# Patient Record
Sex: FEMALE | Race: WHITE | Employment: FULL TIME | URBAN - METROPOLITAN AREA
[De-identification: names, ages, dates, MRNs, and addresses within clinical notes are randomized per-mention and may not be internally consistent; named-entity substitution may affect disease eponyms.]

---

## 2017-10-03 ENCOUNTER — ALLSCRIPTS OFFICE VISIT (OUTPATIENT)
Dept: OTHER | Facility: OTHER | Age: 42
End: 2017-10-03

## 2017-10-27 NOTE — PROGRESS NOTES
Assessment  1  Nodule of tendon sheath (360 34) (M67 90)    Plan  Nodule of tendon sheath    · 1 - Carlos RIVERA, Lorenzo Bojorquez  (Orthopedic Surgery) Co-Management  *  Status: Active   Requested for: [id number]  Care Summary provided  : Yes    Discussion/Summary    #1  Right 4th finger tendon sheath nodule- refer to [facility] Dr Kavitha Gil for eval  and treat  Chief Complaint  c/o lump at base of 4th finger on right hand  Pt  states she has had it for several years but it is becoming painful and affecting daily life  kck      History of Present Illness  HPI: Patient here today because she has had a right 4th finger tendon sheath lump for over 2 years now which in the past has never been painful however most recently for the past few weeks has become bigger and painful and hurts when she is doing certain things like weights at the gym or caring or gripping objects  She is right-handed  At this point in time she would like to see the specialist who can take care of this  Review of Systems    Constitutional: No fever, no chills, feels well, no tiredness, no recent weight gain or loss  ENT: no ear ache, no loss of hearing, no nosebleeds or nasal discharge, no sore throat or hoarseness  Cardiovascular: no complaints of slow or fast heart rate, no chest pain, no palpitations, no leg claudication or lower extremity edema  Respiratory: no complaints of shortness of breath, no wheezing, no dyspnea on exertion, no orthopnea or PND  Breasts: no complaints of breast pain, breast lump or nipple discharge  Gastrointestinal: no complaints of abdominal pain, no constipation, no nausea or diarrhea, no vomiting, no bloody stools  Genitourinary: no complaints of dysuria, no incontinence, no pelvic pain, no dysmenorrhea, no vaginal discharge or abnormal vaginal bleeding  Musculoskeletal: as noted in HPI  Integumentary: no complaints of skin rash or lesion, no itching or dry skin, no skin wounds     Neurological: no complaints of headache, no confusion, no numbness or tingling, no dizziness or fainting  ROS reviewed  Active Problems  1  Allergic rhinitis (477 9) (J30 9)   2  Canker sores oral (528 2) (K12 0)   3  Chronic low back pain (724 2,338 29) (M54 5,G89 29)   4  Depression (311) (F32 9)   5  Foot pain (729 5) (M79 673)   6  Hand abrasion (914 0) (S60 519A)   7  Heel pain (729 5) (M79 673)   8  Imaging Studies Nonspecific Abnormal Findings Spine (793 99)   9  Immunotherapy   10  Migraine headache (346 90) (G43 909)   11  Need for prophylactic vaccination and inoculation against influenza (V04 81) (Z23)   12  Nodule of tendon sheath (727 89) (M67 90)   13  Pain in joint of right hip (719 45) (M25 551)    Past Medical History  1  History of chicken pox (V12 09) (Z86 19)  Active Problems And Past Medical History Reviewed: The active problems and past medical history were reviewed and updated today  Family History  Father    1  Family history of Diabetes Mellitus (V18 0)   2  Family history of Hyperlipidemia  Family History Reviewed: The family history was reviewed and updated today  Social History   · Being A Social Drinker   · Denied: History of Drug Use   · Marital History - Currently    · Never A Smoker  The social history was reviewed and updated today  Surgical History  Surgical History Reviewed: The surgical history was reviewed and updated today  Current Meds   1  Daily Value Multivitamin Oral Tablet; Take 1 tablet daily Recorded   2  ZyrTEC Allergy TABS Recorded    The medication list was reviewed and updated today  Allergies  1  No Known Drug Allergies    Vitals   Recorded: 42ARZ7415 02:08PM   Heart Rate 68   Systolic 938, LUE, Sitting   Diastolic 60, LUE, Sitting   Height 5 ft 3 in     Physical Exam    Constitutional   General appearance: No acute distress, well appearing and well nourished  Eyes   Conjunctiva and lids: No swelling, erythema or discharge      Ears, Nose, Mouth, and Throat   External inspection of ears and nose: Normal     Pulmonary   Respiratory effort: No increased work of breathing or signs of respiratory distress  Musculoskeletal   Gait and station: Normal     Inspection/palpation of joints, bones, and muscles: Abnormal  -- Right fourth and see PT joint volar surface with a palpable movable 3 mm sized nodule noted on the flexor surface of the MCP 4th right finger     Psychiatric   Mood and affect: Normal          Signatures   Electronically signed by : [facility]; Oct  3 2017  2:32PM EST                       (Author)    Electronically signed by : Corey Andrade MD; Oct  3 2017  4:59PM EST                       (Author)

## 2017-11-15 ENCOUNTER — ALLSCRIPTS OFFICE VISIT (OUTPATIENT)
Dept: OTHER | Facility: OTHER | Age: 42
End: 2017-11-15

## 2017-11-15 ENCOUNTER — HOSPITAL ENCOUNTER (OUTPATIENT)
Dept: RADIOLOGY | Facility: HOSPITAL | Age: 42
Discharge: HOME | End: 2017-11-15
Attending: ORTHOPAEDIC SURGERY
Payer: COMMERCIAL

## 2017-11-15 DIAGNOSIS — M79.646 PAIN OF FINGER: ICD-10-CM

## 2017-11-15 PROCEDURE — 73130 X-RAY EXAM OF HAND: CPT

## 2017-11-16 NOTE — PROGRESS NOTES
Assessment    1  Ganglion cyst of finger of right hand (722 43) (M67 441)    Plan  Finger pain    · * XR FINGER RIGHT SECOND DIGIT-INDEX; Status:Active - Retrospective By ProtocolAuthorization; Requested for:[id number];   Ganglion cyst of finger of right hand    · [facility] Instruction Sheet Ganglion Cysts Given; Status:Complete;   Done: 29BED6927    Discussion/Summary    Discussed conservative versus nonconservative treatment options, versus benefits and prognosis  this time, patient opts for conservative management of gentle massage over area and monitoring  restrictions on weightbearing or activities at this timecondition worsensas neededattestation:  Dennie Sero am acting as a scribe while in the presence of the attending physician  Joslyn Murrell MD, personally performed the services described in this documentation as scribed in my presence  Chief Complaint    1  Finger Problem  Patient presents for a small painful lump at the base of her right ring finger times approximately 1-2 months  History of Present Illness  HPI: Patient is a 15-year-old right-hand-dominant female who presents for a small painful lump at the base of her right ring finger  She states she has noticed this lump in the past and it has gone away on its own  However, now it has been present for approximately the past 1 month  It is not growing in size  It is tender to palpation and when objects rest on it  She denies any numbness tingling fever or chills  Denies any past or recent trauma to set finger  is noncontributoryis noncontributorypast medical, surgical, family, and social history as well as medications and allergies were reviewed  Updates as needed were performed  Review of systems was recorded on a separate intake sheet, this was reviewed as well  Review of Systems   Constitutional: no fever-- and-- no chills  Eyes: no eyesight problems-- and-- eyes not red    ENT: no hearing loss,-- no nosebleeds-- and-- no sore throat  Cardiovascular: no chest pain-- and-- no palpitations  Respiratory: no shortness of breath-- and-- no wheezing  Gastrointestinal: no abdominal pain  Musculoskeletal: as noted in HPI  Integumentary: no rashes  Neurological: no numbness-- and-- no tingling  Active Problems  1  Allergic rhinitis (477 9) (J30 9)   2  Canker sores oral (528 2) (K12 0)   3  Chronic low back pain (724 2,338 29) (M54 5,G89 29)   4  Depression (311) (F32 9)   5  Finger pain (729 5) (M79 646)   6  Foot pain (729 5) (M79 673)   7  Hand abrasion (914 0) (S60 519A)   8  Heel pain (729 5) (M79 673)   9  Imaging Studies Nonspecific Abnormal Findings Spine (793 99)   10  Immunotherapy   11  Migraine headache (346 90) (G43 909)   12  Need for prophylactic vaccination and inoculation against influenza (V04 81) (Z23)   13  Nodule of tendon sheath (727 89) (M67 90)   14  Pain in joint of right hip (719 45) (M25 551)    Past Medical History   · History of chicken pox (V12 09) (Z86 19)    Family History   · Family history of Diabetes Mellitus (V18 0)   · Family history of Hyperlipidemia    Social History   · Being A Social Drinker   · Denied: History of Drug Use   · Marital History - Currently    · Never A Smoker    Current Meds   1  Daily Value Multivitamin Oral Tablet; Take 1 tablet daily Recorded   2  ZyrTEC Allergy TABS Recorded    Allergies  1  No Known Drug Allergies    Vitals  Signs     Heart Rate: 83  Systolic: 845  Diastolic: 75  Height: 5 ft 3 in  Weight: 171 lb 4 oz  BMI Calculated: 30 34  BSA Calculated: 1 81    Physical Exam     General: No acute distress, age-appropriate  HEENT: Normocephalic atraumatic, mucous membranes are moist, sclera are nonicteric  Cardiovascular: No discernable arrhythmia  Respiratory: Breathing is even and unlabored, no stridor or audible wheezing  Right Basic:  Right hand/fingers: There is no gross deformity, erythema edema or ecchymosis   There is a small, 2 x 2 mm hard nodule at the base of the right ring finger on the volar side midline  This is slightly tender to palpation  There is no discharge  Full active and passive range of motion motion at the ring finger  Sensation is intact brisk capillary refill  Results/Data  I personally reviewed the films/images/results in the office today  My interpretation follows  X-ray Review X-rays of the right hand and fingers appear normal there is no gross deformity, no fracture, no lucencies or bony lesions        Signatures   Electronically signed by : LORELEI Kirby; Nov 15 2017  9:13AM EST                       (Author)    Electronically signed by : FYA Viramontes ; Nov 15 2017 12:53PM EST                       (Author)

## 2018-01-13 VITALS
HEART RATE: 83 BPM | DIASTOLIC BLOOD PRESSURE: 75 MMHG | SYSTOLIC BLOOD PRESSURE: 110 MMHG | BODY MASS INDEX: 30.34 KG/M2 | HEIGHT: 63 IN | WEIGHT: 171.25 LBS

## 2018-01-14 VITALS — HEIGHT: 63 IN | HEART RATE: 68 BPM | SYSTOLIC BLOOD PRESSURE: 128 MMHG | DIASTOLIC BLOOD PRESSURE: 60 MMHG

## 2018-01-16 NOTE — CONSULTS
Chief Complaint    1  Finger Problem  Patient presents for a small painful lump at the base of her right ring finger times approximately 1-2 months  History of Present Illness  HPI: Patient is a 13-year-old right-hand-dominant female who presents for a small painful lump at the base of her right ring finger  She states she has noticed this lump in the past and it has gone away "on its own"  However, now it has been present for approximately the past 1 month  It is not growing in size  It is tender to palpation and when objects rest on it  She denies any numbness tingling fever or chills  Denies any past or recent trauma to set finger  PMH is noncontributory  PSH is noncontributory  The past medical, surgical, family, and social history as well as medications and allergies were reviewed  Updates as needed were performed  Review of systems was recorded on a separate intake sheet, this was reviewed as well  Review of Systems    Constitutional: no fever and no chills  Eyes: no eyesight problems and eyes not red  ENT: no hearing loss, no nosebleeds and no sore throat  Cardiovascular: no chest pain and no palpitations  Respiratory: no shortness of breath and no wheezing  Gastrointestinal: no abdominal pain  Musculoskeletal: as noted in HPI  Integumentary: no rashes  Neurological: no numbness and no tingling  Active Problems    1  Allergic rhinitis (477 9) (J30 9)   2  Canker sores oral (528 2) (K12 0)   3  Chronic low back pain (724 2,338 29) (M54 5,G89 29)   4  Depression (311) (F32 9)   5  Finger pain (729 5) (M79 646)   6  Foot pain (729 5) (M79 673)   7  Hand abrasion (914 0) (S60 519A)   8  Heel pain (729 5) (M79 673)   9  Imaging Studies Nonspecific Abnormal Findings Spine (793 99)   10  Immunotherapy   11  Migraine headache (346 90) (G43 909)   12  Need for prophylactic vaccination and inoculation against influenza (V04 81) (Z23)   13   Nodule of tendon sheath (727 89) (M67 90) 14  Pain in joint of right hip (111 45) (M24 001)    Past Medical History    · History of chicken pox (V12 09) (Z86 19)    Family History    · Family history of Diabetes Mellitus (V18 0)   · Family history of Hyperlipidemia    Social History    · Being A Social Drinker   · Denied: History of Drug Use   · Marital History - Currently    · Never A Smoker    Current Meds   1  Daily Value Multivitamin Oral Tablet; Take 1 tablet daily Recorded   2  ZyrTEC Allergy TABS Recorded    Allergies    1  No Known Drug Allergies    Vitals  Signs    Heart Rate: 49MZKTXTRB: 135AQJRWKNJU: 25TRKMTY: 5 ft 3 inWeight: 171 lb 4 ozBMI Calculated: 30 34BSA Calculated: 1 81    Physical Exam      General: No acute distress, age-appropriate  HEENT: Normocephalic atraumatic, mucous membranes are moist, sclera are nonicteric  Cardiovascular: No discernable arrhythmia  Respiratory: Breathing is even and unlabored, no stridor or audible wheezing  Right Basic:   Right hand/fingers: There is no gross deformity, erythema edema or ecchymosis  There is a small, 2 x 2 mm hard nodule at the base of the right ring finger on the volar side midline  This is slightly tender to palpation  There is no discharge  Full active and passive range of motion motion at the ring finger  Sensation is intact brisk capillary refill  Results/Data  I personally reviewed the films/images/results in the office today  My interpretation follows  X-ray Review X-rays of the right hand and fingers appear normal there is no gross deformity, no fracture, no lucencies or bony lesions  Assessment    1  Ganglion cyst of finger of right hand (728 43) (M67 210)    Plan     Finger pain    · * XR FINGER RIGHT SECOND DIGIT-INDEX; Status:Active - Retrospective By Protocol  Authorization;  Requested for:15Ncl1020;      Ganglion cyst of finger of right hand    · [facility] Instruction Sheet Ganglion Cysts Given; Status:Complete;   Done: 98TWR1530    Discussion/Summary    Discussed conservative versus nonconservative treatment options, versus benefits and prognosis  At this time, patient opts for conservative management of gentle massage over area and monitoring  No restrictions on weightbearing or activities at this time  Call condition worsens  Follow-up as needed  Scribe attestation:    Ednarohan Sandy am acting as a scribe while in the presence of the attending physician  Physician Attestation:    Mauro Ellis MD, personally performed the services described in this documentation as scribed in my presence        Signatures   Electronically signed by : LORELEI Porter; Nov 15 2017  9:13AM EST                       (Author)    Electronically signed by : FAY Melendez ; Nov 15 2017 12:53PM EST                       (Author)

## 2018-01-17 NOTE — CONSULTS
Chief Complaint    1  Finger Problem  Chief Complaint Free Text Note Form: Patient presents for a small painful lump at the base of her right ring finger times approximately 1-2 months  History of Present Illness  HPI: Patient is a 24-year-old right-hand-dominant female who presents for a small painful lump at the base of her right ring finger  She states she has noticed this lump in the past and it has gone away "on its own"  However, now it has been present for approximately the past 1 month  It is not growing in size  It is tender to palpation and when objects rest on it  She denies any numbness tingling fever or chills  Denies any past or recent trauma to set finger  PMH is noncontributory  PSH is noncontributory  The past medical, surgical, family, and social history as well as medications and allergies were reviewed  Updates as needed were performed  Review of systems was recorded on a separate intake sheet, this was reviewed as well  Review of Systems  Focused-Female Orthopedics:   Constitutional: no fever and no chills  Eyes: no eyesight problems and eyes not red  ENT: no hearing loss, no nosebleeds and no sore throat  Cardiovascular: no chest pain and no palpitations  Respiratory: no shortness of breath and no wheezing  Gastrointestinal: no abdominal pain  Musculoskeletal: as noted in HPI  Integumentary: no rashes  Neurological: no numbness and no tingling  Active Problems    1  Allergic rhinitis (477 9) (J30 9)   2  Canker sores oral (528 2) (K12 0)   3  Chronic low back pain (724 2,338 29) (M54 5,G89 29)   4  Depression (311) (F32 9)   5  Finger pain (729 5) (M79 646)   6  Foot pain (729 5) (M79 673)   7  Hand abrasion (914 0) (S60 519A)   8  Heel pain (729 5) (M79 673)   9  Imaging Studies Nonspecific Abnormal Findings Spine (793 99)   10  Immunotherapy   11  Migraine headache (346 90) (G43 909)   12   Need for prophylactic vaccination and inoculation against influenza (V04 81) (Z23)   13  Nodule of tendon sheath (727 89) (M67 90)   14  Pain in joint of right hip (719 45) (M25 821)    Past Medical History    1  History of chicken pox (V12 09) (Z86 19)    Family History    1  Family history of Diabetes Mellitus (V18 0)   2  Family history of Hyperlipidemia    Social History    · Being A Social Drinker   · Denied: History of Drug Use   · Marital History - Currently    · Never A Smoker    Current Meds   1  Daily Value Multivitamin Oral Tablet; Take 1 tablet daily Recorded   2  ZyrTEC Allergy TABS Recorded    Allergies    1  No Known Drug Allergies    Vitals  Signs   Recorded: 39GIO2403 08:42AM   Heart Rate: 83  Systolic: 060  Diastolic: 75  Height: 5 ft 3 in  Weight: 171 lb 4 oz  BMI Calculated: 30 34  BSA Calculated: 1 81    Physical Exam      General: No acute distress, age-appropriate  HEENT: Normocephalic atraumatic, mucous membranes are moist, sclera are nonicteric  Cardiovascular: No discernable arrhythmia  Respiratory: Breathing is even and unlabored, no stridor or audible wheezing  Right Basic:   Right hand/fingers: There is no gross deformity, erythema edema or ecchymosis  There is a small, 2 x 2 mm hard nodule at the base of the right ring finger on the volar side midline  This is slightly tender to palpation  There is no discharge  Full active and passive range of motion motion at the ring finger  Sensation is intact brisk capillary refill  Results/Data  Diagnostic Studies Reviewed: I personally reviewed the films/images/results in the office today  My interpretation follows  X-ray Review X-rays of the right hand and fingers appear normal there is no gross deformity, no fracture, no lucencies or bony lesions  Assessment    1  Ganglion cyst of finger of right hand (907 43) (M67 441)    Plan  Finger pain    1  * XR FINGER RIGHT SECOND DIGIT-INDEX; Status:Active - Retrospective By Protocol   Authorization;  Requested for:[id number];   Ganglion cyst of finger of right hand    2  [facility] Instruction Sheet Ganglion Cysts Given; Status:Complete;   Done: 27FWN2292    Discussion/Summary  Discussion Summary:   Discussed conservative versus nonconservative treatment options, versus benefits and prognosis  At this time, patient opts for conservative management of gentle massage over area and monitoring  No restrictions on weightbearing or activities at this time  Call condition worsens  Follow-up as needed  Scribe attestation:    Nicole Joseph am acting as a scribe while in the presence of the attending physician  Physician Attestation:    Shilpa Jesus MD, personally performed the services described in this documentation as scribed in my presence        Signatures   Electronically signed by : LORELEI Agustin; Nov 15 2017  9:13AM EST                       (Author)    Electronically signed by : FAY Hall ; Nov 15 2017 12:53PM EST                       (Author)

## 2018-06-28 ENCOUNTER — OFFICE VISIT (OUTPATIENT)
Dept: FAMILY MEDICINE CLINIC | Facility: CLINIC | Age: 43
End: 2018-06-28
Payer: COMMERCIAL

## 2018-06-28 VITALS
DIASTOLIC BLOOD PRESSURE: 70 MMHG | HEART RATE: 68 BPM | BODY MASS INDEX: 30.26 KG/M2 | SYSTOLIC BLOOD PRESSURE: 124 MMHG | WEIGHT: 170.8 LBS

## 2018-06-28 DIAGNOSIS — R53.82 CHRONIC FATIGUE: Primary | ICD-10-CM

## 2018-06-28 DIAGNOSIS — F33.0 MILD EPISODE OF RECURRENT MAJOR DEPRESSIVE DISORDER (HCC): ICD-10-CM

## 2018-06-28 PROBLEM — R53.83 FATIGUE: Status: ACTIVE | Noted: 2018-05-08

## 2018-06-28 PROBLEM — M67.441 GANGLION CYST OF FINGER OF RIGHT HAND: Status: ACTIVE | Noted: 2017-11-15

## 2018-06-28 PROCEDURE — 99214 OFFICE O/P EST MOD 30 MIN: CPT | Performed by: PHYSICIAN ASSISTANT

## 2018-06-28 RX ORDER — ESCITALOPRAM OXALATE 10 MG/1
10 TABLET ORAL DAILY
Qty: 30 TABLET | Refills: 1 | Status: SHIPPED | OUTPATIENT
Start: 2018-06-28 | End: 2018-11-19 | Stop reason: SDUPTHER

## 2018-06-28 NOTE — PATIENT INSTRUCTIONS
Problem List Items Addressed This Visit        Other    Mild episode of recurrent major depressive disorder ([facility] [address] )     Plan is to start low dose generic lexapro 10 mg 1/2 once daily and recheck in 4 weeks  Check to see if Hernan Arnold is still practicing therapy at our office  Relevant Medications    escitalopram (LEXAPRO) 10 mg tablet    Fatigue - Primary     TSH CBC within normal limits Lyme and CMP fasting ordered           Relevant Orders    Lyme Antibody Profile with reflex to WB    Comprehensive metabolic panel

## 2018-06-28 NOTE — PROGRESS NOTES
Assessment/Plan:    Fatigue  TSH CBC within normal limits Lyme and CMP fasting ordered  Mild episode of recurrent major depressive disorder ([address] )  Plan is to start low dose generic lexapro 10 mg 1/2 once daily and recheck in 4 weeks  Check to see if Becky Steven is still practicing therapy at our office  Diagnoses and all orders for this visit:    Chronic fatigue  -     Lyme Antibody Profile with reflex to WB; Future  -     Comprehensive metabolic panel; Future    Mild episode of recurrent major depressive disorder (HCC)  -     escitalopram (LEXAPRO) 10 mg tablet; Take 1 tablet (10 mg total) by mouth daily for 30 days          Subjective:   CC: c/o increased fatigue over the last several months  Pt  Did have blood work done with GYN  Pt  Would like to discuss blood work results in care everywhere  Pt  States she was treated for depression in her early 19's and feels like she is having an increase in depression sx  Pt  States she was on a small dose of Paxil previously  [facility]        Patient ID: Sadie Kruse is a 37 y o  female  Patient here today with complaints of fatigue and likely resurfacing of depression  She states that when she was in her 25s she was treated for roughly about 3 years with Paxil at 5 mg daily  She states the 10 mg made her to excited and giddy  She states that recently last fall 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 and then in January her  fell and broke his right ankle and was unable to drive and needed lots of appointments and surgery and therefore she all of a sudden had to go 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 parent having to do everything in the entire house and also dealing with her 17-year-old and 15year-old  They also were racing seeing eye dog at that time  Also her and her  since then has been seeing a counselor and having stress with her marriage    She states that every time she feels like something in her life light needs up something else happen for example this week her father law had heart attack in [place] and now she has to care for their [place] home  Her 1 good friend got a divorce and has to move out of her house and she is trying to be strong and help her as well  Patient states that she is tearful very often  She was having lots of difficulty sleeping but had this had to do with her hip which she has been getting worked on by chiropractor and this is actually improving  No suicidal or homicidal ideations  She did get blood work done for fatigue with gyn which included a normal TSH and CBC  Lyme and fasting CMP were not ordered  She states that she feels that the family counseling her too much and she is very overwhelmed and sometimes thinks that if she were just to leave the house for 1 week and come back but would finally realize how much she actually does for the family and she would stop feeling like a maid  The following portions of the patient's history were reviewed and updated as appropriate: allergies, current medications, past family history, past medical history, past social history, past surgical history and problem list     Review of Systems   Constitutional: Positive for fatigue  HENT: Negative  Eyes: Negative  Respiratory: Negative  Cardiovascular: Negative  Gastrointestinal: Negative  Endocrine: Negative  Genitourinary: Negative  Musculoskeletal: Negative  Skin: Negative  Allergic/Immunologic: Negative  Neurological: Negative  Hematological: Negative  Psychiatric/Behavioral: Positive for dysphoric mood and sleep disturbance  Negative for suicidal ideas  Objective:      Vitals:    06/28/18 0903   BP: 124/70   BP Location: Left arm   Patient Position: Sitting   Pulse: 68   Weight: 77 5 kg (170 lb 12 8 oz)            Physical Exam   Constitutional: She is oriented to person, place, and time   She appears well-developed and well-nourished  No distress  HENT:   Head: Normocephalic and atraumatic  Eyes: Conjunctivae are normal  Right eye exhibits no discharge  Left eye exhibits no discharge  Neck: Trachea normal  Neck supple  Carotid bruit is not present  No thyromegaly present  Cardiovascular: Normal rate, regular rhythm and normal heart sounds  Exam reveals no gallop and no friction rub  No murmur heard  Pulmonary/Chest: Effort normal and breath sounds normal  No respiratory distress  She has no wheezes  She has no rales  Neurological: She is alert and oriented to person, place, and time  Skin: Skin is warm and dry  She is not diaphoretic  Psychiatric: She has a normal mood and affect  Judgment normal    Nursing note and vitals reviewed

## 2018-07-19 LAB
ALBUMIN SERPL-MCNC: 4.1 G/DL (ref 3.6–5.1)
ALBUMIN/GLOB SERPL: 2 (CALC) (ref 1–2.5)
ALP SERPL-CCNC: 35 U/L (ref 33–115)
ALT SERPL-CCNC: 13 U/L (ref 6–29)
AST SERPL-CCNC: 12 U/L (ref 10–35)
B BURGDOR AB SER IA-ACNC: <0.9 INDEX
BILIRUB SERPL-MCNC: 0.5 MG/DL (ref 0.2–1.2)
BUN SERPL-MCNC: 14 MG/DL (ref 7–25)
BUN/CREAT SERPL: NORMAL (CALC) (ref 6–22)
CALCIUM SERPL-MCNC: 9 MG/DL (ref 8.6–10.2)
CHLORIDE SERPL-SCNC: 107 MMOL/L (ref 98–110)
CO2 SERPL-SCNC: 25 MMOL/L (ref 20–31)
CREAT SERPL-MCNC: 0.69 MG/DL (ref 0.5–1.1)
GLOBULIN SER CALC-MCNC: 2.1 G/DL (CALC) (ref 1.9–3.7)
GLUCOSE SERPL-MCNC: 94 MG/DL (ref 65–99)
POTASSIUM SERPL-SCNC: 4.3 MMOL/L (ref 3.5–5.3)
PROT SERPL-MCNC: 6.2 G/DL (ref 6.1–8.1)
SL AMB EGFR AFRICAN AMERICAN: 122 ML/MIN/1.73M2
SL AMB EGFR NON AFRICAN AMERICAN: 105 ML/MIN/1.73M2
SODIUM SERPL-SCNC: 138 MMOL/L (ref 135–146)

## 2018-08-16 ENCOUNTER — OFFICE VISIT (OUTPATIENT)
Dept: FAMILY MEDICINE CLINIC | Facility: CLINIC | Age: 43
End: 2018-08-16
Payer: COMMERCIAL

## 2018-08-16 VITALS
HEIGHT: 64 IN | BODY MASS INDEX: 29.81 KG/M2 | WEIGHT: 174.6 LBS | SYSTOLIC BLOOD PRESSURE: 118 MMHG | HEART RATE: 68 BPM | DIASTOLIC BLOOD PRESSURE: 60 MMHG

## 2018-08-16 DIAGNOSIS — F33.0 MILD EPISODE OF RECURRENT MAJOR DEPRESSIVE DISORDER (HCC): Primary | ICD-10-CM

## 2018-08-16 PROCEDURE — 3008F BODY MASS INDEX DOCD: CPT | Performed by: PHYSICIAN ASSISTANT

## 2018-08-16 PROCEDURE — 99213 OFFICE O/P EST LOW 20 MIN: CPT | Performed by: PHYSICIAN ASSISTANT

## 2018-08-16 NOTE — PROGRESS NOTES
Assessment/Plan:    Mild episode of recurrent major depressive disorder (HCC)  Improved on 1/2 lexapro 10 mg once daily  Continue however pt may increase to a full tab at any time  Recheck in 3 months  Diagnoses and all orders for this visit:    Mild episode of recurrent major depressive disorder ([address] )    Other orders  -     Cetirizine HCl (ZYRTEC ALLERGY) 10 MG CAPS; Take by mouth  -     Multiple Vitamin (DAILY VALUE MULTIVITAMIN PO); Take 1 tablet by mouth daily          Subjective:   CC: 1 month follow up for medication check  Pt  Has been taking lexapro 5mg, she has not increased to the 10mg at this point  Patient ID: Garrett Tnog is a 37 y o  female  Patient here today to review initiation of Lexapro  She has been taking half 10 mg once daily for 1 month now and she feels a great difference  She states that she is now able to handle any stressful situation in a calmer [place] without getting angry and upset  She has been very busy with her summer schedule bring her kids to different various camps working new puppy visiting in [facility] in [place]  She did just come back from vacation herself  She feels that is working great at this point time but does not know how she will feel once a kids get into their normal school year routine  No side effects noted  No longer crying with a little as thing like watching TV and a sad show comes on  The following portions of the patient's history were reviewed and updated as appropriate: allergies, current medications, past family history, past medical history, past social history, past surgical history and problem list     Review of Systems   Constitutional: Negative  HENT: Negative  Eyes: Negative  Respiratory: Negative  Cardiovascular: Negative  Gastrointestinal: Negative  Endocrine: Negative  Genitourinary: Negative  Musculoskeletal: Negative  Skin: Negative  Allergic/Immunologic: Negative      Neurological: Negative  Hematological: Negative  Psychiatric/Behavioral: Negative  Objective:      Vitals:    08/16/18 1132   BP: 118/60   BP Location: Left arm   Patient Position: Sitting   Pulse: 68   Weight: 79 2 kg (174 lb 9 6 oz)   Height: 5' 3 5" (1 613 m)            Physical Exam   Constitutional: She is oriented to person, place, and time  She appears well-developed and well-nourished  No distress  HENT:   Head: Normocephalic and atraumatic  Eyes: Conjunctivae are normal  Right eye exhibits no discharge  Left eye exhibits no discharge  Neck: Neck supple  Carotid bruit is not present  Cardiovascular: Normal rate and regular rhythm  Pulmonary/Chest: Effort normal  No respiratory distress  Neurological: She is alert and oriented to person, place, and time  Skin: Skin is warm and dry  She is not diaphoretic  Psychiatric: She has a normal mood and affect  Her behavior is normal  Judgment and thought content normal    Nursing note and vitals reviewed

## 2018-08-16 NOTE — PATIENT INSTRUCTIONS
Problem List Items Addressed This Visit        Other    Mild episode of recurrent major depressive disorder ([facility] [address] ) - Primary     Improved on 1/2 lexapro 10 mg once daily  Continue however pt may increase to a full tab at any time  Recheck in 3 months

## 2018-08-16 NOTE — ASSESSMENT & PLAN NOTE
Improved on 1/2 lexapro 10 mg once daily  Continue however pt may increase to a full tab at any time  Recheck in 3 months

## 2018-11-19 ENCOUNTER — OFFICE VISIT (OUTPATIENT)
Dept: FAMILY MEDICINE CLINIC | Facility: CLINIC | Age: 43
End: 2018-11-19
Payer: COMMERCIAL

## 2018-11-19 VITALS
WEIGHT: 181.8 LBS | SYSTOLIC BLOOD PRESSURE: 124 MMHG | DIASTOLIC BLOOD PRESSURE: 60 MMHG | BODY MASS INDEX: 31.7 KG/M2 | HEART RATE: 80 BPM

## 2018-11-19 DIAGNOSIS — F33.0 MILD EPISODE OF RECURRENT MAJOR DEPRESSIVE DISORDER (HCC): Primary | ICD-10-CM

## 2018-11-19 PROCEDURE — 1036F TOBACCO NON-USER: CPT | Performed by: PHYSICIAN ASSISTANT

## 2018-11-19 PROCEDURE — 99214 OFFICE O/P EST MOD 30 MIN: CPT | Performed by: PHYSICIAN ASSISTANT

## 2018-11-19 RX ORDER — ESCITALOPRAM OXALATE 10 MG/1
10 TABLET ORAL DAILY
Qty: 30 TABLET | Refills: 11 | Status: SHIPPED | OUTPATIENT
Start: 2018-11-19 | End: 2020-03-24 | Stop reason: SDUPTHER

## 2018-11-19 NOTE — ASSESSMENT & PLAN NOTE
Uncontrolled  Will increase lexapro to normal 10 mg daily  She is having her first therapy session with Kirby Hackett today  Recheck in 4-6 weeks after holidays

## 2018-11-19 NOTE — PROGRESS NOTES
Assessment/Plan:     Mild episode of recurrent major depressive disorder (HCC)  Uncontrolled  Will increase lexapro to normal 10 mg daily  She is having her first therapy session with Lorren Homans today  Recheck in 4-6 weeks after holidays  Diagnoses and all orders for this visit:    Mild episode of recurrent major depressive disorder ([facility] [address] )  -     escitalopram (LEXAPRO) 10 mg tablet; Take 1 tablet (10 mg total) by mouth daily for 30 days    Other orders  -     Probiotic Product (PROBIOTIC ADVANCED PO); Take by mouth          Subjective:   CC: 3 month follow up for medication check  Discuss medication dose and refills needed  crystal     Patient ID: Cyn Fernandez is a 37 y o  female  Cyn Fernandez is here for chronic conditions f/u including the diagnosis of Mild episode of recurrent major depressive disorder (hcc)  (primary encounter diagnosis)   Pt  states they are taking all medications as directed without complaints or side effects  Patient is quite upset today because since her last visit in September her son who is 15 and in 7th grade had developed a headache disorder and dizziness and needed [place] be [place] [facility] and basically all workup is negative in the ongoing probable diagnosis is viral but he needs to be on heavy antihistamine as an alternative medication for headaches in small children and the dose has been working and then as he gains weight has not been working and he has been having a very difficult time in school  Patient is looking for to Thanksgiving they are going up to her parent's house by themselves with their dogs  Patient does have her 1st therapy appointment with Dani Wahl next door today  Patient is thinking that she might need to increase her Lexapro dose to a full 10 mg tablet once daily          The following portions of the patient's history were reviewed and updated as appropriate: allergies, current medications, past family history, past medical history, past social history, past surgical history and problem list     Review of Systems   Constitutional: Negative  HENT: Negative  Eyes: Negative  Respiratory: Negative  Cardiovascular: Negative  Gastrointestinal: Negative  Endocrine: Negative  Genitourinary: Negative  Musculoskeletal: Negative  Skin: Negative  Allergic/Immunologic: Negative  Neurological: Negative  Hematological: Negative  Psychiatric/Behavioral: Positive for dysphoric mood and sleep disturbance  The patient is nervous/anxious  Objective:      Vitals:    11/19/18 0842   BP: 124/60   BP Location: Left arm   Patient Position: Sitting   Pulse: 80   Weight: 82 5 kg (181 lb 12 8 oz)            Physical Exam   Constitutional: She is oriented to person, place, and time  She appears well-developed and well-nourished  No distress  HENT:   Head: Normocephalic and atraumatic  Eyes: Conjunctivae are normal  Right eye exhibits no discharge  Left eye exhibits no discharge  Neck: Neck supple  Carotid bruit is not present  Cardiovascular: Normal rate, regular rhythm and normal heart sounds  Exam reveals no gallop and no friction rub  No murmur heard  Pulmonary/Chest: Effort normal and breath sounds normal  No respiratory distress  She has no wheezes  She has no rales  Neurological: She is alert and oriented to person, place, and time  Skin: Skin is warm and dry  She is not diaphoretic  Psychiatric: She has a normal mood and affect  Judgment normal    Very tearful throughout visit  Nursing note and vitals reviewed

## 2018-11-19 NOTE — PATIENT INSTRUCTIONS
Problem List Items Addressed This Visit        Other    Mild episode of recurrent major depressive disorder ([facility] [address] ) - Primary     Uncontrolled  Will increase lexapro to normal 10 mg daily  She is having her first therapy session with Shelby Rodriguez today  Recheck in 4-6 weeks after holidays            Relevant Medications    escitalopram (LEXAPRO) 10 mg tablet

## 2018-12-31 ENCOUNTER — OFFICE VISIT (OUTPATIENT)
Dept: FAMILY MEDICINE CLINIC | Facility: CLINIC | Age: 43
End: 2018-12-31
Payer: COMMERCIAL

## 2018-12-31 VITALS
HEART RATE: 68 BPM | HEIGHT: 64 IN | BODY MASS INDEX: 31.24 KG/M2 | WEIGHT: 183 LBS | DIASTOLIC BLOOD PRESSURE: 64 MMHG | SYSTOLIC BLOOD PRESSURE: 108 MMHG | RESPIRATION RATE: 20 BRPM

## 2018-12-31 DIAGNOSIS — F33.0 MILD EPISODE OF RECURRENT MAJOR DEPRESSIVE DISORDER (HCC): Primary | ICD-10-CM

## 2018-12-31 PROCEDURE — 99213 OFFICE O/P EST LOW 20 MIN: CPT | Performed by: PHYSICIAN ASSISTANT

## 2018-12-31 PROCEDURE — 3008F BODY MASS INDEX DOCD: CPT | Performed by: PHYSICIAN ASSISTANT

## 2018-12-31 PROCEDURE — 1036F TOBACCO NON-USER: CPT | Performed by: PHYSICIAN ASSISTANT

## 2018-12-31 RX ORDER — CETIRIZINE HYDROCHLORIDE 10 MG/1
TABLET, CHEWABLE ORAL
COMMUNITY
Start: 2012-10-23 | End: 2019-02-11
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
## 2018-12-31 NOTE — PATIENT INSTRUCTIONS
Problem List Items Addressed This Visit        Other    Mild episode of recurrent major depressive disorder ([facility] [address] ) - Primary     Much improved  Continue current strength of lexapro  Recheck in 4 months  Continue therapy

## 2019-02-11 ENCOUNTER — OFFICE VISIT (OUTPATIENT)
Dept: FAMILY MEDICINE CLINIC | Facility: CLINIC | Age: 44
End: 2019-02-11
Payer: COMMERCIAL

## 2019-02-11 VITALS
SYSTOLIC BLOOD PRESSURE: 112 MMHG | WEIGHT: 188.4 LBS | BODY MASS INDEX: 33.38 KG/M2 | HEIGHT: 63 IN | DIASTOLIC BLOOD PRESSURE: 60 MMHG | HEART RATE: 80 BPM

## 2019-02-11 DIAGNOSIS — F33.0 MILD EPISODE OF RECURRENT MAJOR DEPRESSIVE DISORDER (HCC): Primary | ICD-10-CM

## 2019-02-11 PROCEDURE — 99213 OFFICE O/P EST LOW 20 MIN: CPT | Performed by: PHYSICIAN ASSISTANT

## 2019-02-11 PROCEDURE — 3008F BODY MASS INDEX DOCD: CPT | Performed by: PHYSICIAN ASSISTANT

## 2019-02-11 PROCEDURE — 1036F TOBACCO NON-USER: CPT | Performed by: PHYSICIAN ASSISTANT

## 2019-02-11 NOTE — ASSESSMENT & PLAN NOTE
Pt feels that she is no longer in need of medication  She has been on a decreased dose of lexapro at 5 mg for the past month so at this time I will recommend stopping or taking 1/2 of 5 mg for another two weeks and then stop

## 2019-02-11 NOTE — PATIENT INSTRUCTIONS
Problem List Items Addressed This Visit        Other    Mild episode of recurrent major depressive disorder ([facility] [address] ) - Primary     Pt feels that she is no longer in need of medication  She has been on a decreased dose of lexapro at 5 mg for the past month so at this time I will recommend stopping or taking 1/2 of 5 mg for another two weeks and then stop

## 2019-02-11 NOTE — PROGRESS NOTES
Assessment/Plan:    Mild episode of recurrent major depressive disorder ([facility] [address] )  Pt feels that she is no longer in need of medication  She has been on a decreased dose of lexapro at 5 mg for the past month so at this time I will recommend stopping or taking 1/2 of 5 mg for another two weeks and then stop  Diagnoses and all orders for this visit:    Mild episode of recurrent major depressive disorder ([facility] [address] )          Subjective:   CC: Discuss stopping the Lexapro, c/o rash she thinks is related to medication and  Pt  states she seems to be doing better  Patient ID: Palomo Salinas is a 37 y o  female  November noted she was tired on the full 10 lexapro or not able to sleep  Then tried 1/2 am and 1/2 pm  Then started only 1/2 at 5 mg at noon for about 3-4 weeks now  She states that she feels better than ever on a lower dose and would like to continue to wean off of medication  No suicidal ideations homicidal ideations or anxiety  She states she still has a life with stress but she is coping with everything a lot better than she was a year ago  She did just refill the medication and does have enough to wean and then enough if she needs to restart in the future  The following portions of the patient's history were reviewed and updated as appropriate: allergies, current medications, past family history, past medical history, past social history, past surgical history and problem list     Review of Systems   Constitutional: Negative  HENT: Negative  Eyes: Negative  Respiratory: Negative  Cardiovascular: Negative  Gastrointestinal: Negative  Endocrine: Negative  Genitourinary: Negative  Musculoskeletal: Negative  Skin: Positive for rash  Allergic/Immunologic: Negative  Neurological: Negative  Hematological: Negative  Psychiatric/Behavioral: Negative            Objective:      Vitals:    02/11/19 0939   BP: 112/60   BP Location: Left arm   Patient Position: Sitting Pulse: 80   Weight: 85 5 kg (188 lb 6 4 oz)   Height: 5' 3" (1 6 m)            Physical Exam   Constitutional: She is oriented to person, place, and time  She appears well-developed and well-nourished  No distress  HENT:   Head: Normocephalic and atraumatic  Eyes: Conjunctivae are normal  Right eye exhibits no discharge  Left eye exhibits no discharge  Neck: Neck supple  Carotid bruit is not present  Cardiovascular: Normal rate  Pulmonary/Chest: Effort normal    Neurological: She is alert and oriented to person, place, and time  Skin: Skin is warm and dry  She is not diaphoretic  Psychiatric: She has a normal mood and affect  Judgment normal    Nursing note and vitals reviewed

## 2020-03-21 DIAGNOSIS — F33.0 MILD EPISODE OF RECURRENT MAJOR DEPRESSIVE DISORDER (HCC): ICD-10-CM

## 2020-03-21 RX ORDER — ESCITALOPRAM OXALATE 10 MG/1
TABLET ORAL
Qty: 30 TABLET | Refills: 11 | OUTPATIENT
Start: 2020-03-21

## 2020-03-24 ENCOUNTER — TELEMEDICINE (OUTPATIENT)
Dept: FAMILY MEDICINE CLINIC | Facility: CLINIC | Age: 45
End: 2020-03-24
Payer: COMMERCIAL

## 2020-03-24 DIAGNOSIS — Z13.220 LIPID SCREENING: ICD-10-CM

## 2020-03-24 DIAGNOSIS — F33.0 MILD EPISODE OF RECURRENT MAJOR DEPRESSIVE DISORDER (HCC): Primary | ICD-10-CM

## 2020-03-24 DIAGNOSIS — F33.8 SEASONAL AFFECTIVE DISORDER (HCC): ICD-10-CM

## 2020-03-24 PROCEDURE — 99213 OFFICE O/P EST LOW 20 MIN: CPT | Performed by: PHYSICIAN ASSISTANT

## 2020-03-24 RX ORDER — ESCITALOPRAM OXALATE 5 MG/1
5 TABLET ORAL DAILY
Qty: 30 TABLET | Refills: 3 | Status: SHIPPED | OUTPATIENT
Start: 2020-03-24 | End: 2020-06-12 | Stop reason: SDUPTHER

## 2020-03-24 NOTE — ASSESSMENT & PLAN NOTE
Restart Lexapro but will restart at a 5 mg and patient will take half of this once daily starting roughly in January of every year and then discontinuing after few months  At this time patient feels that she will probably stay on it longer because of the current world situation with the virus  Patient denies any suicidal homicidal ideations at this time

## 2020-03-24 NOTE — ASSESSMENT & PLAN NOTE
I do not see lipid panel in patient's chart so I will order lipid panel fasting with CMP to for patient to get done once it is safe to go back out to the labs

## 2020-03-24 NOTE — PROGRESS NOTES
Virtual Regular Visit    Problem List Items Addressed This Visit        Other    Mild episode of recurrent major depressive disorder ([address] ) - Primary    Relevant Medications    escitalopram (LEXAPRO) 5 mg tablet    Seasonal affective disorder (HCC)     Restart Lexapro but will restart at a 5 mg and patient will take half of this once daily starting roughly in January of every year and then discontinuing after few months  At this time patient feels that she will probably stay on it longer because of the current world situation with the virus  Patient denies any suicidal homicidal ideations at this time  Relevant Medications    escitalopram (LEXAPRO) 5 mg tablet    Lipid screening     I do not see lipid panel in patient's chart so I will order lipid panel fasting with CMP to for patient to get done once it is safe to go back out to the labs  Relevant Orders    Lipid panel    Comprehensive metabolic panel          Telephone visit today unable to take vital signs  Reason for visit is medication discussion  Encounter provider Aisha Alston PA-C    Provider located at [address] [place] [address] PRIMARY CARE  [address] [place] [address]      Recent Visits  No visits were found meeting these conditions  Showing recent visits within past 7 days and meeting all other requirements     Future Appointments  No visits were found meeting these conditions  Showing future appointments within next 150 days and meeting all other requirements        After connecting through [employer], the patient was identified by name and date of birth  Oscar Guzman was informed that this is a telemedicine visit and that the visit is being conducted through telephone which may not be secure and therefore, might not be HIPAA-compliant  My office door was closed  No one else was in the room  She acknowledged consent and understanding of privacy and security of the video platform   The patient has agreed to participate and understands they can discontinue the visit at any time  Crystal Connelly is a 50 y o  female pt  Past Medical History:   Diagnosis Date    Chicken pox 03/04/2015    Last assessed    Depression        History reviewed  No pertinent surgical history  Current Outpatient Medications   Medication Sig Dispense Refill    Cetirizine HCl (ZYRTEC ALLERGY) 10 MG CAPS Take by mouth      escitalopram (LEXAPRO) 5 mg tablet Take 1 tablet (5 mg total) by mouth daily 30 tablet 3    Multiple Vitamin (DAILY VALUE MULTIVITAMIN PO) Take 1 tablet by mouth daily      Probiotic Product (PROBIOTIC ADVANCED PO) Take by mouth       No current facility-administered medications for this visit  Allergies   Allergen Reactions    Gluten Meal     Wheat Bran        Review of Systems   Constitutional: Negative  HENT: Negative  Eyes: Negative  Respiratory: Negative  Cardiovascular: Negative  Gastrointestinal: Negative  Endocrine: Negative  Genitourinary: Negative  Musculoskeletal: Negative  Skin: Negative  Allergic/Immunologic: Negative  Neurological: Negative  Hematological: Negative  Psychiatric/Behavioral: Positive for dysphoric mood  I spent 10 minutes with the patient during this visit  HPI  Patient requested a refill of her Lexapro and it was flagged because we had not given her a prescription since the fall of 2018  Patient then had telephone visit to discuss  Patient states that she was using Lexapro 10 mg and only taking a quarter of it daily throughout the winter months and thinks she may have seasonal affective disorder  She had left over medication and therefore started taking a quarter of it once daily about 1 month ago and needs refills  She denies any suicidal or homicidal ideations at this time she is not sick no fever nausea vomiting or diarrhea    She states that she does see gyn and had mammogram and Pap up-to-date with [facility] within the year  She states when asked she has not had cholesterol tested in a very long time and I do not see anywhere in her notes  She states that the Lexapro works very quickly for her and within a week she actually feels relief  No problems with anxiety  Data to review:        COVID 19 Instructions    Joe Schwab was advised to limit contact with others to essential tasks such as getting food, medications, and medical care  Proper handwashing reviewed, and Hand sanitzer when washing is not available  If the patient develops symptoms of COVID 19, the patient should call the office as soon as possible            Virtual Visit expected code: [id number]

## 2020-03-24 NOTE — PATIENT INSTRUCTIONS
Problem List Items Addressed This Visit        Other    Mild episode of recurrent major depressive disorder ([facility] [address] ) - Primary    Relevant Medications    escitalopram (LEXAPRO) 5 mg tablet    Seasonal affective disorder (HCC)     Restart Lexapro but will restart at a 5 mg and patient will take half of this once daily starting roughly in January of every year and then discontinuing after few months  At this time patient feels that she will probably stay on it longer because of the current world situation with the virus  Patient denies any suicidal homicidal ideations at this time  Relevant Medications    escitalopram (LEXAPRO) 5 mg tablet    Lipid screening     I do not see lipid panel in patient's chart so I will order lipid panel fasting with CMP to for patient to get done once it is safe to go back out to the labs           Relevant Orders    Lipid panel    Comprehensive metabolic panel

## 2020-03-25 ENCOUNTER — TELEPHONE (OUTPATIENT)
Dept: ADMINISTRATIVE | Facility: OTHER | Age: 45
End: 2020-03-25

## 2020-03-25 NOTE — TELEPHONE ENCOUNTER
Upon review of the In Basket request we were able to locate, review, and update the patient chart as requested for Mammogram     Any additional questions or concerns should be emailed to the Practice Liaisons via [email or web address] email, please do not reply via In Basket      Thank you  Roe Cam

## 2020-03-25 NOTE — TELEPHONE ENCOUNTER
----- Message from Ann-Marie León sent at 3/25/2020  8:11 AM EDT -----  Regarding: pap  Contact: [phone]  03/25/20 8:12 AM    Hello, our patient Ravinder Jaime has had Pap Smear (HPV) aka Cervical Cancer Screening completed/performed  Please assist in updating the patient chart by pulling the Care Everywhere (CE) document  The date of service is 05/08/2018       Thank you,  Shena De Leon  [facility] PRIMARY CARE

## 2020-03-25 NOTE — TELEPHONE ENCOUNTER
Upon review of the In Basket request we were able to locate, review, and update the patient chart as requested for Pap Smear (HPV) aka Cervical Cancer Screening  Any additional questions or concerns should be emailed to the Practice Liaisons via [email or web address] email, please do not reply via In Basket      Thank you  Tom Rizo

## 2020-03-25 NOTE — TELEPHONE ENCOUNTER
----- Message from Cierra Simmons sent at 3/25/2020  8:13 AM EDT -----  Regarding: mammogram  Contact: [phone]  03/25/20 8:14 AM    Hello, our patient Rachel Joiner has had Mammogram completed/performed  Please assist in updating the patient chart by pulling a previous Electronic Medical Record (EMR) document  The previous EMR is care everywhere  The date of service is 05/10/2019      Thank you,  Shena Johnson [facility] PRIMARY CARE

## 2020-06-12 ENCOUNTER — TELEMEDICINE (OUTPATIENT)
Dept: FAMILY MEDICINE CLINIC | Facility: CLINIC | Age: 45
End: 2020-06-12
Payer: COMMERCIAL

## 2020-06-12 VITALS — HEIGHT: 64 IN | BODY MASS INDEX: 31.58 KG/M2 | WEIGHT: 185 LBS

## 2020-06-12 DIAGNOSIS — Z13.220 LIPID SCREENING: ICD-10-CM

## 2020-06-12 DIAGNOSIS — Z12.39 BREAST CANCER SCREENING: Primary | ICD-10-CM

## 2020-06-12 DIAGNOSIS — F33.0 MILD EPISODE OF RECURRENT MAJOR DEPRESSIVE DISORDER (HCC): ICD-10-CM

## 2020-06-12 DIAGNOSIS — R53.83 FATIGUE, UNSPECIFIED TYPE: ICD-10-CM

## 2020-06-12 DIAGNOSIS — G43.B0 OPHTHALMOPLEGIC MIGRAINE, NOT INTRACTABLE: ICD-10-CM

## 2020-06-12 PROCEDURE — 3008F BODY MASS INDEX DOCD: CPT | Performed by: PHYSICIAN ASSISTANT

## 2020-06-12 PROCEDURE — 1036F TOBACCO NON-USER: CPT | Performed by: PHYSICIAN ASSISTANT

## 2020-06-12 PROCEDURE — 99214 OFFICE O/P EST MOD 30 MIN: CPT | Performed by: PHYSICIAN ASSISTANT

## 2020-06-12 RX ORDER — ESCITALOPRAM OXALATE 10 MG/1
10 TABLET ORAL DAILY
Qty: 30 TABLET | Refills: 3 | Status: SHIPPED | OUTPATIENT
Start: 2020-06-12 | End: 2020-12-20

## 2020-06-22 ENCOUNTER — TELEPHONE (OUTPATIENT)
Dept: FAMILY MEDICINE CLINIC | Facility: CLINIC | Age: 45
End: 2020-06-22

## 2020-06-24 DIAGNOSIS — K90.41 WHEAT INTOLERANCE: Primary | ICD-10-CM

## 2020-06-29 LAB
ALBUMIN SERPL-MCNC: 3.9 G/DL (ref 3.6–5.1)
ALBUMIN/GLOB SERPL: 1.7 (CALC) (ref 1–2.5)
ALP SERPL-CCNC: 49 U/L (ref 31–125)
ALT SERPL-CCNC: 33 U/L (ref 6–29)
AST SERPL-CCNC: 20 U/L (ref 10–35)
B BURGDOR AB SER IA-ACNC: <0.9 INDEX
BASOPHILS # BLD AUTO: 51 CELLS/UL (ref 0–200)
BASOPHILS NFR BLD AUTO: 1.1 %
BILIRUB SERPL-MCNC: 0.3 MG/DL (ref 0.2–1.2)
BUN SERPL-MCNC: 14 MG/DL (ref 7–25)
BUN/CREAT SERPL: ABNORMAL (CALC) (ref 6–22)
CALCIUM SERPL-MCNC: 8.7 MG/DL (ref 8.6–10.2)
CHLORIDE SERPL-SCNC: 106 MMOL/L (ref 98–110)
CHOLEST SERPL-MCNC: 198 MG/DL
CHOLEST/HDLC SERPL: 3.4 (CALC)
CO2 SERPL-SCNC: 23 MMOL/L (ref 20–32)
CREAT SERPL-MCNC: 0.66 MG/DL (ref 0.5–1.1)
EOSINOPHIL # BLD AUTO: 129 CELLS/UL (ref 15–500)
EOSINOPHIL NFR BLD AUTO: 2.8 %
ERYTHROCYTE [DISTWIDTH] IN BLOOD BY AUTOMATED COUNT: 12.7 % (ref 11–15)
GLOBULIN SER CALC-MCNC: 2.3 G/DL (CALC) (ref 1.9–3.7)
GLUCOSE SERPL-MCNC: 93 MG/DL (ref 65–99)
HCT VFR BLD AUTO: 38.3 % (ref 35–45)
HDLC SERPL-MCNC: 59 MG/DL
HGB BLD-MCNC: 12.6 G/DL (ref 11.7–15.5)
IGA SERPL-MCNC: 192 MG/DL (ref 47–310)
LDLC SERPL CALC-MCNC: 119 MG/DL (CALC)
LYMPHOCYTES # BLD AUTO: 1403 CELLS/UL (ref 850–3900)
LYMPHOCYTES NFR BLD AUTO: 30.5 %
MCH RBC QN AUTO: 31.2 PG (ref 27–33)
MCHC RBC AUTO-ENTMCNC: 32.9 G/DL (ref 32–36)
MCV RBC AUTO: 94.8 FL (ref 80–100)
MONOCYTES # BLD AUTO: 331 CELLS/UL (ref 200–950)
MONOCYTES NFR BLD AUTO: 7.2 %
NEUTROPHILS # BLD AUTO: 2686 CELLS/UL (ref 1500–7800)
NEUTROPHILS NFR BLD AUTO: 58.4 %
NONHDLC SERPL-MCNC: 139 MG/DL (CALC)
PLATELET # BLD AUTO: 301 THOUSAND/UL (ref 140–400)
PMV BLD REES-ECKER: 9.5 FL (ref 7.5–12.5)
POTASSIUM SERPL-SCNC: 4.3 MMOL/L (ref 3.5–5.3)
PROT SERPL-MCNC: 6.2 G/DL (ref 6.1–8.1)
RBC # BLD AUTO: 4.04 MILLION/UL (ref 3.8–5.1)
SL AMB EGFR AFRICAN AMERICAN: 122 ML/MIN/1.73M2
SL AMB EGFR NON AFRICAN AMERICAN: 105 ML/MIN/1.73M2
SODIUM SERPL-SCNC: 137 MMOL/L (ref 135–146)
TRIGL SERPL-MCNC: 96 MG/DL
TSH SERPL-ACNC: 2.22 MIU/L
TTG IGA SER-ACNC: 1 U/ML
WBC # BLD AUTO: 4.6 THOUSAND/UL (ref 3.8–10.8)

## 2020-07-05 DIAGNOSIS — R79.89 ELEVATED LFTS: Primary | ICD-10-CM

## 2020-07-09 PROBLEM — R53.1 WEAKNESS: Status: ACTIVE | Noted: 2020-07-09

## 2020-12-20 DIAGNOSIS — F33.0 MILD EPISODE OF RECURRENT MAJOR DEPRESSIVE DISORDER (HCC): ICD-10-CM

## 2020-12-20 RX ORDER — ESCITALOPRAM OXALATE 10 MG/1
TABLET ORAL
Qty: 30 TABLET | Refills: 3 | Status: SHIPPED | OUTPATIENT
Start: 2020-12-20 | End: 2021-08-05

## 2021-04-09 DIAGNOSIS — Z23 ENCOUNTER FOR IMMUNIZATION: ICD-10-CM

## 2021-05-20 ENCOUNTER — TRANSCRIBE ORDERS (OUTPATIENT)
Dept: LAB | Facility: CLINIC | Age: 46
End: 2021-05-20

## 2021-05-20 ENCOUNTER — LAB (OUTPATIENT)
Dept: LAB | Facility: CLINIC | Age: 46
End: 2021-05-20
Payer: COMMERCIAL

## 2021-05-20 DIAGNOSIS — Z13.6 SCREENING FOR CARDIOVASCULAR CONDITION: ICD-10-CM

## 2021-05-20 DIAGNOSIS — Z13.29 SCREENING FOR THYROID DISORDER: ICD-10-CM

## 2021-05-20 DIAGNOSIS — Z13.1 SCREENING FOR DIABETES MELLITUS: ICD-10-CM

## 2021-05-20 DIAGNOSIS — R79.89 ELEVATED LFTS: ICD-10-CM

## 2021-05-20 DIAGNOSIS — Z13.1 SCREENING FOR DIABETES MELLITUS: Primary | ICD-10-CM

## 2021-05-20 LAB
ALBUMIN SERPL BCP-MCNC: 3.9 G/DL (ref 3.5–5)
ALP SERPL-CCNC: 62 U/L (ref 46–116)
ALT SERPL W P-5'-P-CCNC: 30 U/L (ref 12–78)
AST SERPL W P-5'-P-CCNC: 13 U/L (ref 5–45)
BILIRUB DIRECT SERPL-MCNC: <0.05 MG/DL (ref 0–0.2)
BILIRUB SERPL-MCNC: 0.41 MG/DL (ref 0.2–1)
EST. AVERAGE GLUCOSE BLD GHB EST-MCNC: 103 MG/DL
HBA1C MFR BLD: 5.2 %
HBV CORE AB SER QL: NORMAL
HBV CORE IGM SER QL: NORMAL
HBV SURFACE AG SER QL: NORMAL
HCV AB SER QL: NORMAL
PROT SERPL-MCNC: 7.2 G/DL (ref 6.4–8.2)
TSH SERPL DL<=0.05 MIU/L-ACNC: 3.47 UIU/ML (ref 0.36–3.74)

## 2021-05-20 PROCEDURE — 83036 HEMOGLOBIN GLYCOSYLATED A1C: CPT

## 2021-05-20 PROCEDURE — 84443 ASSAY THYROID STIM HORMONE: CPT

## 2021-05-20 PROCEDURE — 87340 HEPATITIS B SURFACE AG IA: CPT

## 2021-05-20 PROCEDURE — 86704 HEP B CORE ANTIBODY TOTAL: CPT

## 2021-05-20 PROCEDURE — 80061 LIPID PANEL: CPT

## 2021-05-20 PROCEDURE — 80076 HEPATIC FUNCTION PANEL: CPT

## 2021-05-20 PROCEDURE — 86803 HEPATITIS C AB TEST: CPT

## 2021-05-20 PROCEDURE — 86705 HEP B CORE ANTIBODY IGM: CPT

## 2021-05-21 LAB
CHOLEST SERPL-MCNC: 213 MG/DL (ref 50–200)
HDLC SERPL-MCNC: 60 MG/DL
LDLC SERPL CALC-MCNC: 132 MG/DL (ref 0–100)
NONHDLC SERPL-MCNC: 153 MG/DL
TRIGL SERPL-MCNC: 105 MG/DL

## 2021-05-21 NOTE — RESULT ENCOUNTER NOTE
Can we call and make sure lab is running lipid panel? It says in chart "still active to be collected"?

## 2021-05-22 NOTE — RESULT ENCOUNTER NOTE
Please let pt know her labs are normal except for a slightly elevated bad LDL at 132  Check yearly  Decrease fat/cholesterol  Can review at her next apt

## 2021-08-05 ENCOUNTER — OFFICE VISIT (OUTPATIENT)
Dept: FAMILY MEDICINE CLINIC | Facility: CLINIC | Age: 46
End: 2021-08-05
Payer: COMMERCIAL

## 2021-08-05 VITALS
WEIGHT: 199 LBS | SYSTOLIC BLOOD PRESSURE: 90 MMHG | HEIGHT: 64 IN | BODY MASS INDEX: 33.97 KG/M2 | HEART RATE: 84 BPM | TEMPERATURE: 97.8 F | DIASTOLIC BLOOD PRESSURE: 58 MMHG

## 2021-08-05 DIAGNOSIS — F33.0 MILD EPISODE OF RECURRENT MAJOR DEPRESSIVE DISORDER (HCC): ICD-10-CM

## 2021-08-05 DIAGNOSIS — R42 DIZZINESS: Primary | ICD-10-CM

## 2021-08-05 DIAGNOSIS — Z12.31 ENCOUNTER FOR SCREENING MAMMOGRAM FOR BREAST CANCER: ICD-10-CM

## 2021-08-05 DIAGNOSIS — F33.8 SEASONAL AFFECTIVE DISORDER (HCC): ICD-10-CM

## 2021-08-05 PROBLEM — Z83.3 FAMILY HISTORY OF DIABETES MELLITUS: Status: ACTIVE | Noted: 2021-05-24

## 2021-08-05 PROBLEM — E66.9 CLASS 2 OBESITY: Status: ACTIVE | Noted: 2021-05-24

## 2021-08-05 PROBLEM — E66.812 CLASS 2 OBESITY: Status: ACTIVE | Noted: 2021-05-24

## 2021-08-05 PROCEDURE — 3725F SCREEN DEPRESSION PERFORMED: CPT | Performed by: PHYSICIAN ASSISTANT

## 2021-08-05 PROCEDURE — 99214 OFFICE O/P EST MOD 30 MIN: CPT | Performed by: PHYSICIAN ASSISTANT

## 2021-08-05 PROCEDURE — 1036F TOBACCO NON-USER: CPT | Performed by: PHYSICIAN ASSISTANT

## 2021-08-05 PROCEDURE — 3008F BODY MASS INDEX DOCD: CPT | Performed by: PHYSICIAN ASSISTANT

## 2021-08-05 RX ORDER — ESCITALOPRAM OXALATE 10 MG/1
10 TABLET ORAL DAILY
Qty: 30 TABLET | Refills: 3 | Status: SHIPPED | OUTPATIENT
Start: 2021-08-05 | End: 2022-04-12 | Stop reason: SDUPTHER

## 2021-08-05 NOTE — PROGRESS NOTES
Assessment and Plan:    Problem List Items Addressed This Visit        Other    Mild episode of recurrent major depressive disorder ([facility] )    Relevant Medications    escitalopram (LEXAPRO) 10 mg tablet    Seasonal affective disorder ([facility] )     Doing well off lexapro currently  Will be OK to restart lexapro this fall/winter  Relevant Medications    escitalopram (LEXAPRO) 10 mg tablet    Dizziness - Primary     Refer to  PT for vestibular eval and treat  Relevant Orders    Ambulatory referral to Physical Therapy                 Diagnoses and all orders for this visit:    Dizziness  -     Ambulatory referral to Physical Therapy; Future    Seasonal affective disorder (HCC)  -     escitalopram (LEXAPRO) 10 mg tablet; Take 1 tablet (10 mg total) by mouth daily    Mild episode of recurrent major depressive disorder (HCC)  -     escitalopram (LEXAPRO) 10 mg tablet; Take 1 tablet (10 mg total) by mouth daily              Subjective:      Patient ID: Momo De Anda is a 48 y o  female  CC:    Chief Complaint   Patient presents with    Dizziness     c/o dizziness for approximately 3 weeks  She mentioned she lost weight, BP was lower at [facility], she stopped her antidepressant  mjs       HPI:      Patient for the past 3 weeks has been feeling slightly off mostly when standing not really necessarily with position changes  No feelings of nausea vomiting although she has been getting more ocular migraines than usual   She does not get a spinning dizzy but like a drunky dizzy type and only lasts for seconds and then she feels okay  She had vertigo in the remote past which is very different for her that she did need to do maneuvers 4        The following portions of the patient's history were reviewed and updated as appropriate: allergies, current medications, past family history, past medical history, past social history, past surgical history and problem list       Review of Systems   Constitutional: Negative  HENT: Negative  Eyes: Negative  Respiratory: Negative  Cardiovascular: Negative  Gastrointestinal: Negative  Endocrine: Negative  Genitourinary: Negative  Musculoskeletal: Negative  Skin: Negative  Allergic/Immunologic: Negative  Neurological: Positive for dizziness  Hematological: Negative  Psychiatric/Behavioral: Negative  Data to review:       Objective:    Vitals:    08/05/21 1133   BP: 90/58   Pulse: 84   Temp: 97 8 °F (36 6 °C)   Weight: 90 3 kg (199 lb)   Height: 5' 3 5" (1 613 m)        Physical Exam  Vitals and nursing note reviewed  Constitutional:       Appearance: Normal appearance  She is well-developed  HENT:      Head: Normocephalic and atraumatic  Right Ear: Hearing, tympanic membrane, ear canal and external ear normal       Left Ear: Hearing, tympanic membrane, ear canal and external ear normal    Eyes:      General: Lids are normal       Conjunctiva/sclera: Conjunctivae normal       Pupils: Pupils are equal, round, and reactive to light  Comments:  Patient with recurrence of "off "symptoms when patient looks side to side with eyes only and also in the lying position when her head is turned to the right  Cardiovascular:      Rate and Rhythm: Normal rate and regular rhythm  Heart sounds: No murmur heard  Pulmonary:      Effort: Pulmonary effort is normal       Breath sounds: Normal breath sounds  Skin:     General: Skin is warm and dry  Neurological:      General: No focal deficit present  Mental Status: She is alert  Coordination: Coordination is intact  Psychiatric:         Mood and Affect: Mood normal          Behavior: Behavior normal  Behavior is cooperative  Thought Content: Thought content normal          Judgment: Judgment normal            BMI Counseling: Body mass index is 34 7 kg/m²   The BMI is above normal  Nutrition recommendations include decreasing portion sizes, encouraging healthy choices of fruits and vegetables, decreasing fast food intake, consuming healthier snacks, limiting drinks that contain sugar, moderation in carbohydrate intake, increasing intake of lean protein, reducing intake of saturated and trans fat and reducing intake of cholesterol  Exercise recommendations include exercising 3-5 times per week  No pharmacotherapy was ordered  BMI Counseling: Body mass index is 34 7 kg/m²  The BMI is above normal  Nutrition recommendations include reducing portion sizes, decreasing overall calorie intake, 3-5 servings of fruits/vegetables daily, reducing fast food intake, consuming healthier snacks, decreasing soda and/or juice intake, moderation in carbohydrate intake, increasing intake of lean protein, reducing intake of saturated fat and trans fat and reducing intake of cholesterol

## 2021-08-05 NOTE — PATIENT INSTRUCTIONS
Problem List Items Addressed This Visit        Other    Mild episode of recurrent major depressive disorder ([facility] )    Relevant Medications    escitalopram (LEXAPRO) 10 mg tablet    Seasonal affective disorder ([facility] )     Doing well off lexapro currently  Will be OK to restart lexapro this fall/winter  Relevant Medications    escitalopram (LEXAPRO) 10 mg tablet    Dizziness - Primary     Refer to [facility] for vestibular eval and treat            Relevant Orders    Ambulatory referral to Physical Therapy

## 2021-10-01 ENCOUNTER — OFFICE VISIT (OUTPATIENT)
Dept: URGENT CARE | Facility: CLINIC | Age: 46
End: 2021-10-01
Payer: COMMERCIAL

## 2021-10-01 VITALS
SYSTOLIC BLOOD PRESSURE: 108 MMHG | HEIGHT: 64 IN | BODY MASS INDEX: 34.15 KG/M2 | HEART RATE: 80 BPM | RESPIRATION RATE: 16 BRPM | OXYGEN SATURATION: 98 % | WEIGHT: 200 LBS | DIASTOLIC BLOOD PRESSURE: 68 MMHG | TEMPERATURE: 96 F

## 2021-10-01 DIAGNOSIS — S81.811A LACERATION OF RIGHT LOWER LEG, INITIAL ENCOUNTER: Primary | ICD-10-CM

## 2021-10-01 PROCEDURE — 99213 OFFICE O/P EST LOW 20 MIN: CPT | Performed by: PHYSICIAN ASSISTANT

## 2021-10-01 PROCEDURE — 90715 TDAP VACCINE 7 YRS/> IM: CPT

## 2021-10-01 PROCEDURE — 90471 IMMUNIZATION ADMIN: CPT

## 2021-10-01 RX ORDER — CEPHALEXIN 500 MG/1
500 CAPSULE ORAL EVERY 8 HOURS SCHEDULED
Qty: 21 CAPSULE | Refills: 0 | Status: SHIPPED | OUTPATIENT
Start: 2021-10-01 | End: 2021-10-08

## 2021-10-12 ENCOUNTER — OFFICE VISIT (OUTPATIENT)
Dept: FAMILY MEDICINE CLINIC | Facility: CLINIC | Age: 46
End: 2021-10-12
Payer: COMMERCIAL

## 2021-10-12 VITALS
TEMPERATURE: 97.2 F | BODY MASS INDEX: 34.36 KG/M2 | HEIGHT: 64 IN | DIASTOLIC BLOOD PRESSURE: 70 MMHG | SYSTOLIC BLOOD PRESSURE: 112 MMHG | WEIGHT: 201.25 LBS

## 2021-10-12 DIAGNOSIS — Z23 ENCOUNTER FOR IMMUNIZATION: ICD-10-CM

## 2021-10-12 DIAGNOSIS — L03.115 CELLULITIS OF RIGHT LEG: Primary | ICD-10-CM

## 2021-10-12 PROCEDURE — 90686 IIV4 VACC NO PRSV 0.5 ML IM: CPT

## 2021-10-12 PROCEDURE — 99213 OFFICE O/P EST LOW 20 MIN: CPT

## 2021-10-12 PROCEDURE — 1036F TOBACCO NON-USER: CPT

## 2021-10-12 PROCEDURE — 90471 IMMUNIZATION ADMIN: CPT

## 2021-10-12 RX ORDER — SULFAMETHOXAZOLE AND TRIMETHOPRIM 800; 160 MG/1; MG/1
1 TABLET ORAL EVERY 12 HOURS SCHEDULED
Qty: 14 TABLET | Refills: 0 | Status: SHIPPED | OUTPATIENT
Start: 2021-10-12 | End: 2021-10-19

## 2021-12-03 ENCOUNTER — TELEPHONE (OUTPATIENT)
Dept: DERMATOLOGY | Facility: CLINIC | Age: 46
End: 2021-12-03

## 2021-12-10 ENCOUNTER — IMMUNIZATIONS (OUTPATIENT)
Dept: FAMILY MEDICINE CLINIC | Facility: HOSPITAL | Age: 46
End: 2021-12-10

## 2021-12-10 DIAGNOSIS — Z23 ENCOUNTER FOR IMMUNIZATION: Primary | ICD-10-CM

## 2021-12-10 PROCEDURE — 91306 COVID-19 MODERNA VACC 0.25 ML BOOSTER: CPT

## 2021-12-10 PROCEDURE — 0064A COVID-19 MODERNA VACC 0.25 ML BOOSTER: CPT

## 2021-12-27 ENCOUNTER — TELEPHONE (OUTPATIENT)
Dept: FAMILY MEDICINE CLINIC | Facility: CLINIC | Age: 46
End: 2021-12-27

## 2021-12-27 DIAGNOSIS — Z11.52 ENCOUNTER FOR SCREENING FOR COVID-19: Primary | ICD-10-CM

## 2021-12-27 PROCEDURE — U0005 INFEC AGEN DETEC AMPLI PROBE: HCPCS | Performed by: FAMILY MEDICINE

## 2021-12-27 PROCEDURE — U0003 INFECTIOUS AGENT DETECTION BY NUCLEIC ACID (DNA OR RNA); SEVERE ACUTE RESPIRATORY SYNDROME CORONAVIRUS 2 (SARS-COV-2) (CORONAVIRUS DISEASE [COVID-19]), AMPLIFIED PROBE TECHNIQUE, MAKING USE OF HIGH THROUGHPUT TECHNOLOGIES AS DESCRIBED BY CMS-2020-01-R: HCPCS | Performed by: FAMILY MEDICINE

## 2021-12-29 ENCOUNTER — TELEMEDICINE (OUTPATIENT)
Dept: FAMILY MEDICINE CLINIC | Facility: CLINIC | Age: 46
End: 2021-12-29
Payer: COMMERCIAL

## 2021-12-29 DIAGNOSIS — U07.1 COVID-19 VIRUS INFECTION: Primary | ICD-10-CM

## 2021-12-29 PROCEDURE — 99213 OFFICE O/P EST LOW 20 MIN: CPT | Performed by: NURSE PRACTITIONER

## 2021-12-29 PROCEDURE — 1036F TOBACCO NON-USER: CPT | Performed by: NURSE PRACTITIONER

## 2021-12-30 ENCOUNTER — TELEMEDICINE (OUTPATIENT)
Dept: FAMILY MEDICINE CLINIC | Facility: CLINIC | Age: 46
End: 2021-12-30
Payer: COMMERCIAL

## 2021-12-30 DIAGNOSIS — U07.1 COVID-19 VIRUS INFECTION: Primary | ICD-10-CM

## 2021-12-30 PROCEDURE — 99213 OFFICE O/P EST LOW 20 MIN: CPT | Performed by: NURSE PRACTITIONER

## 2022-04-05 ENCOUNTER — RA CDI HCC (OUTPATIENT)
Dept: OTHER | Facility: HOSPITAL | Age: 47
End: 2022-04-05

## 2022-04-05 NOTE — PROGRESS NOTES
[facility]  coding opportunities          Chart Reviewed number of suggestions sent to Provider: 2   With the beginning of the new year, this is a reminder to address ALL [facility]  (risk adjustment) codes for 2022 as patient scores reset to zero CHELLE     F33 0 Mild episode of recurrent major depressive disorder ([facility] )  F33 8 Seasonal affective disorder ([facility] )    Patients Insurance        Commercial Insurance: [address] [place] [address]

## 2022-04-12 ENCOUNTER — OFFICE VISIT (OUTPATIENT)
Dept: FAMILY MEDICINE CLINIC | Facility: CLINIC | Age: 47
End: 2022-04-12
Payer: COMMERCIAL

## 2022-04-12 VITALS
TEMPERATURE: 98.6 F | WEIGHT: 198 LBS | HEIGHT: 64 IN | SYSTOLIC BLOOD PRESSURE: 100 MMHG | HEART RATE: 72 BPM | BODY MASS INDEX: 33.8 KG/M2 | DIASTOLIC BLOOD PRESSURE: 62 MMHG

## 2022-04-12 DIAGNOSIS — F33.8 SEASONAL AFFECTIVE DISORDER (HCC): ICD-10-CM

## 2022-04-12 DIAGNOSIS — Z12.11 SCREENING FOR COLON CANCER: Primary | ICD-10-CM

## 2022-04-12 DIAGNOSIS — G43.B0 OPHTHALMOPLEGIC MIGRAINE, NOT INTRACTABLE: ICD-10-CM

## 2022-04-12 DIAGNOSIS — E78.2 MIXED HYPERLIPIDEMIA: ICD-10-CM

## 2022-04-12 DIAGNOSIS — F33.0 MILD EPISODE OF RECURRENT MAJOR DEPRESSIVE DISORDER (HCC): ICD-10-CM

## 2022-04-12 PROBLEM — Z86.16 HISTORY OF COVID-19: Status: ACTIVE | Noted: 2021-12-29

## 2022-04-12 PROBLEM — L03.115 CELLULITIS OF RIGHT LEG: Status: RESOLVED | Noted: 2021-10-12 | Resolved: 2022-04-12

## 2022-04-12 PROBLEM — Z13.220 LIPID SCREENING: Status: RESOLVED | Noted: 2020-03-24 | Resolved: 2022-04-12

## 2022-04-12 PROCEDURE — 3725F SCREEN DEPRESSION PERFORMED: CPT | Performed by: PHYSICIAN ASSISTANT

## 2022-04-12 PROCEDURE — 3008F BODY MASS INDEX DOCD: CPT | Performed by: PHYSICIAN ASSISTANT

## 2022-04-12 PROCEDURE — 99213 OFFICE O/P EST LOW 20 MIN: CPT | Performed by: PHYSICIAN ASSISTANT

## 2022-04-12 PROCEDURE — 1036F TOBACCO NON-USER: CPT | Performed by: PHYSICIAN ASSISTANT

## 2022-04-12 RX ORDER — ESCITALOPRAM OXALATE 10 MG/1
10 TABLET ORAL DAILY
Qty: 30 TABLET | Refills: 11 | Status: SHIPPED | OUTPATIENT
Start: 2022-04-12

## 2022-04-12 NOTE — PATIENT INSTRUCTIONS
Problem List Items Addressed This Visit        Cardiovascular and Mediastinum    Migraine headache     Stable with ibuprofen as needed  Relevant Medications    escitalopram (LEXAPRO) 10 mg tablet       Other    Mild episode of recurrent major depressive disorder (HCC)     NO SI or HI on lexapro only about 3-4 days a week  Relevant Medications    escitalopram (LEXAPRO) 10 mg tablet    Seasonal affective disorder (HCC)    Relevant Medications    escitalopram (LEXAPRO) 10 mg tablet    Mixed hyperlipidemia     One year ago LDL was above 130  Check fasting lipid panel            Relevant Orders    Comprehensive metabolic panel    Lipid Panel with Direct LDL reflex      Other Visit Diagnoses     Screening for colon cancer    -  Primary    Relevant Orders    Ambulatory referral for colonoscopy

## 2022-04-12 NOTE — PROGRESS NOTES
Assessment and Plan:    Problem List Items Addressed This Visit        Cardiovascular and Mediastinum    Migraine headache     Stable with ibuprofen as needed  Relevant Medications    escitalopram (LEXAPRO) 10 mg tablet       Other    Mild episode of recurrent major depressive disorder (HCC)     NO SI or HI on lexapro only about 3-4 days a week  Relevant Medications    escitalopram (LEXAPRO) 10 mg tablet    Seasonal affective disorder (HCC)    Relevant Medications    escitalopram (LEXAPRO) 10 mg tablet    Mixed hyperlipidemia     One year ago LDL was above 130  Check fasting lipid panel  Relevant Orders    Comprehensive metabolic panel    Lipid Panel with Direct LDL reflex      Other Visit Diagnoses     Screening for colon cancer    -  Primary    Relevant Orders    Ambulatory referral for colonoscopy                 Diagnoses and all orders for this visit:    Screening for colon cancer  -     Ambulatory referral for colonoscopy; Future    Seasonal affective disorder (HCC)  -     escitalopram (LEXAPRO) 10 mg tablet; Take 1 tablet (10 mg total) by mouth daily    Mild episode of recurrent major depressive disorder (HCC)  -     escitalopram (LEXAPRO) 10 mg tablet; Take 1 tablet (10 mg total) by mouth daily    Mixed hyperlipidemia  -     Comprehensive metabolic panel  -     Lipid Panel with Direct LDL reflex    Ophthalmoplegic migraine, not intractable    Other orders  -     Discontinue: TobraDex ophthalmic ointment; APPLY 1 RIBBON TO BOTH EYES TWICE A DAY (Patient not taking: Reported on 4/12/2022)              Subjective:      Patient ID: Juan Vick is a 50 y o  female  CC:    Chief Complaint   Patient presents with    Follow-up     6 month f/u - no recent labs - no complaints   notified she is due for colonoscopy pt agreed to have done        HPI:      Juan Vick is here for chronic conditions f/u including the diagnosis of Screening for colon cancer  (primary encounter diagnosis)   Pt  states they are taking all medications as directed without complaints or side effects  Patient states that she is on Lexapro 10 mg she basically only takes about 3 maybe 4 times a week as she keeps forgetting but she states that for whatever she is getting it is help her and she is very stable no SI or HI  She is always stressed about something currently she is a part-time living with her mom as she just had a knee replacement and helping to clean out her attic and also clean out her in-laws attic and entire house as well  Her oldest is a freshman in [place] her youngest is a sophomore in high school  She did have blood work done roughly a little less than a year ago with a slightly elevated LDL  The following portions of the patient's history were reviewed and updated as appropriate: allergies, current medications, past family history, past medical history, past social history, past surgical history and problem list       Review of Systems   Constitutional: Negative  HENT: Negative  Eyes: Negative  Respiratory: Negative  Cardiovascular: Negative  Gastrointestinal: Negative  Endocrine: Negative  Genitourinary: Negative  Musculoskeletal: Negative  Skin: Negative  Allergic/Immunologic: Negative  Neurological: Negative  Hematological: Negative  Psychiatric/Behavioral: Negative  Data to review:       Objective:    Vitals:    04/12/22 1538   BP: 100/62   BP Location: Left arm   Patient Position: Sitting   Cuff Size: Adult   Pulse: 72   Temp: 98 6 °F (37 °C)   TempSrc: Probe   Weight: 89 8 kg (198 lb)   Height: 5' 3 5" (1 613 m)        Physical Exam  Vitals and nursing note reviewed  Constitutional:       Appearance: Normal appearance  She is well-developed  HENT:      Head: Normocephalic and atraumatic     Eyes:      General: Lids are normal       Conjunctiva/sclera: Conjunctivae normal       Pupils: Pupils are equal, round, and reactive to light  Cardiovascular:      Rate and Rhythm: Normal rate and regular rhythm  Heart sounds: No murmur heard  Pulmonary:      Effort: Pulmonary effort is normal       Breath sounds: Normal breath sounds  Skin:     General: Skin is warm and dry  Neurological:      General: No focal deficit present  Mental Status: She is alert  Coordination: Coordination is intact  Psychiatric:         Mood and Affect: Mood normal          Behavior: Behavior normal  Behavior is cooperative  Thought Content: Thought content normal          Judgment: Judgment normal          BMI Counseling: Body mass index is 34 52 kg/m²  The BMI is above normal  Nutrition recommendations include reducing portion sizes, decreasing overall calorie intake, 3-5 servings of fruits/vegetables daily, reducing fast food intake, consuming healthier snacks, decreasing soda and/or juice intake, moderation in carbohydrate intake, increasing intake of lean protein, reducing intake of saturated fat and trans fat and reducing intake of cholesterol  BMI Counseling: Body mass index is 34 52 kg/m²  The BMI is above normal  Nutrition recommendations include decreasing portion sizes, encouraging healthy choices of fruits and vegetables, decreasing fast food intake, consuming healthier snacks, limiting drinks that contain sugar, moderation in carbohydrate intake, increasing intake of lean protein, reducing intake of saturated and trans fat and reducing intake of cholesterol  Exercise recommendations include exercising 3-5 times per week  No pharmacotherapy was ordered  Rationale for BMI follow-up plan is due to patient being overweight or obese

## 2022-04-13 ENCOUNTER — TELEPHONE (OUTPATIENT)
Dept: ADMINISTRATIVE | Facility: OTHER | Age: 47
End: 2022-04-13

## 2022-04-13 NOTE — TELEPHONE ENCOUNTER
----- Message from Osiel Napier MA sent at 4/12/2022  3:36 PM EDT -----  Regarding: care gap request  04/12/22 3:36 PM    Hello, our patient attached above has had Mammogram completed/performed  Please assist in updating the patient chart by pulling the Care Everywhere (CE) document  The date of service is 9/30/21       Thank you,  Osiel Napier MA  [facility]

## 2022-04-13 NOTE — TELEPHONE ENCOUNTER
Upon review of the In Basket request we were able to locate, review, and update the patient chart as requested for Mammogram     Any additional questions or concerns should be emailed to the Practice Liaisons via [email or web address] email, please do not reply via In Basket      Thank you  Kenney Allred MA

## 2022-05-24 ENCOUNTER — OFFICE VISIT (OUTPATIENT)
Dept: DERMATOLOGY | Facility: CLINIC | Age: 47
End: 2022-05-24
Payer: COMMERCIAL

## 2022-05-24 VITALS — HEIGHT: 64 IN | WEIGHT: 199 LBS | BODY MASS INDEX: 33.97 KG/M2 | TEMPERATURE: 97 F

## 2022-05-24 DIAGNOSIS — D18.01 CHERRY ANGIOMA: Primary | ICD-10-CM

## 2022-05-24 DIAGNOSIS — D22.70 MULTIPLE BENIGN MELANOCYTIC NEVI OF UPPER EXTREMITY, LOWER EXTREMITY, AND TRUNK: ICD-10-CM

## 2022-05-24 DIAGNOSIS — D22.60 MULTIPLE BENIGN MELANOCYTIC NEVI OF UPPER EXTREMITY, LOWER EXTREMITY, AND TRUNK: ICD-10-CM

## 2022-05-24 DIAGNOSIS — L81.4 SOLAR LENTIGO: ICD-10-CM

## 2022-05-24 DIAGNOSIS — D22.5 MULTIPLE BENIGN MELANOCYTIC NEVI OF UPPER EXTREMITY, LOWER EXTREMITY, AND TRUNK: ICD-10-CM

## 2022-05-24 DIAGNOSIS — D36.10 NEUROFIBROMA: ICD-10-CM

## 2022-05-24 PROCEDURE — 3008F BODY MASS INDEX DOCD: CPT | Performed by: DERMATOLOGY

## 2022-05-24 PROCEDURE — 1036F TOBACCO NON-USER: CPT | Performed by: DERMATOLOGY

## 2022-05-24 PROCEDURE — 99203 OFFICE O/P NEW LOW 30 MIN: CPT | Performed by: DERMATOLOGY

## 2022-05-24 NOTE — PROGRESS NOTES
[facility] Note     Patient Name: [facility]  Encounter Date: 5/24/22     Have you been cared for by a [facility] Dermatologist in the last 3 years and, if so, which one? No    · Have you traveled outside of the [address] [place] [address] in the past 3 months or outside of the [facility] area in the last 2 weeks? No     May we call your Preferred Phone number to discuss your specific medical information? Yes     May we leave a detailed message that includes your specific medical information? Yes      Today's Chief Concerns:   Concern #1:  Skin exam   Concern #2:      Past Medical History:  Have you personally ever had or currently have any of the following? · Skin cancer (such as Melanoma, Basal Cell Carcinoma, Squamous Cell Carcinoma? (If Yes, please provide more detail)- No  · Eczema: No  · Psoriasis: No  · HIV/AIDS: No  · Hepatitis B or C: No  · Tuberculosis: No  · Systemic Immunosuppression such as Diabetes, Biologic or Immunotherapy, Chemotherapy, Organ Transplantation, Bone Marrow Transplantation (If YES, please provide more detail): No  · Radiation Treatment (If YES, please provide more detail): No  · Any other major medical conditions/concerns? (If Yes, which types)- No    Social History:     What is/was your primary occupation? [employer]        Family History:  Have any of your "first degree relatives" (parent, brother, sister, or child) had any of the following       · Skin cancer such as Melanoma or Merkel Cell Carcinoma or Pancreatic Cancer? No  · Eczema, Asthma, Hay Fever or Seasonal Allergies: No  · Psoriasis or Psoriatic Arthritis: No  · Do any other medical conditions seem to run in your family? If Yes, what condition and which relatives?   No    Current Medications:   (please update all dermatological medications before printing patient's AVS!)      Current Outpatient Medications:     Cetirizine HCl (ZYRTEC ALLERGY) 10 MG CAPS, Take by mouth, Disp: , Rfl:     escitalopram (LEXAPRO) 10 mg tablet, Take 1 tablet (10 mg total) by mouth daily, Disp: 30 tablet, Rfl: 11    Multiple Vitamin (DAILY VALUE MULTIVITAMIN PO), Take 1 tablet by mouth daily, Disp: , Rfl:     Probiotic Product (PROBIOTIC ADVANCED PO), Take by mouth, Disp: , Rfl:       Review of Systems:  Have you recently had or currently have any of the following? If YES, what are you doing for the problem? · Fever, chills or unintended weight loss: No  · Sudden loss or change in your vision: No  · Nausea, vomiting or blood in your stool: No  · Painful or swollen joints: No  · Wheezing or cough: No  · Changing mole or non-healing wound: No  · Nosebleeds: No  · Excessive sweating: No  · Easy or prolonged bleeding? No  · Over the last 2 weeks, how often have you been bothered by the following problems? · Taking little interest or pleasure in doing things: 1 - Not at All  · Feeling down, depressed, or hopeless: 1 - Not at All  · Rapid heartbeat with epinephrine:  No    · FEMALES ONLY:    · Are you pregnant or planning to become pregnant? No  · Are you currently or planning to be nursing or breast feeding? No    · Any known allergies? Allergies   Allergen Reactions    Gluten Meal - Food Allergy     Wheat Bran - Food Allergy     Bupropion Anxiety   ·       Physical Exam:     Was a chaperone (Derm Clinical Assistant) present throughout the entire Physical Exam? Yes     Did the Dermatology Team specifically  the patient on the importance of a Full Skin Exam to be sure that nothing is missed clinically?  Yes}  o Did the patient ultimately request or accept a Full Skin Exam?  Yes  o Did the patient specifically refuse to have the areas "under-the-bra" examined by the Dermatologist? No  o Did the patient specifically refuse to have the areas "under-the-underwear" examined by the Dermatologist? No    CONSTITUTIONAL:   Vitals:    05/24/22 0916   Temp: (!) 97 °F (36 1 °C)   TempSrc: Temporal   Weight: 90 3 kg (199 lb)   Height: 5' 3 5" (1 613 m)           PSYCH: Normal mood and affect  EYES: Normal conjunctiva  ENT: Normal lips and oral mucosa  CARDIOVASCULAR: No edema  RESPIRATORY: Normal respirations  HEME/LYMPH/IMMUNO:  No regional lymphadenopathy except as noted below in "ASSESSMENT AND PLAN BY DIAGNOSIS"    SKIN:  FULL ORGAN SYSTEM EXAM  Hair, Scalp, Ears, Face Normal except as noted below in Assessment   Neck Normal except as noted below in Assessment   Right Arm/Hand/Fingers Normal except as noted below in Assessment   Left Arm/Hand/Fingers Normal except as noted below in Assessment   Chest/Breasts/Axillae Viewed areas Normal except as noted below in Assessment   Abdomen, Umbilicus Normal except as noted below in Assessment   Back/Spine Normal except as noted below in Assessment   Groin/Genitalia/Buttocks Normal except as noted below in Assessment   Right Leg, Foot, Toes Normal except as noted below in Assessment   Left Leg, Foot, Toes Normal except as noted below in Assessment        Assessment and Plan by Diagnosis:    History of Present Condition:     Duration:  How long has this been an issue for you?    o  patient here for skin exam for preventative purpose   Location Affected:  Where on the body is this affecting you?    o  n/a   Quality:  Is there any bleeding, pain, itch, burning/irritation, or redness associated with the skin lesion?    o  n/a   Severity:  Describe any bleeding, pain, itch, burning/irritation, or redness on a scale of 1 to 10 (with 10 being the worst)  o  n/a   Timing:  Does this condition seem to be there pretty constantly or do you notice it more at specific times throughout the day?     o  consistent   Context:  Have you ever noticed that this condition seems to be associated with specific activities you do?    o  n/a   Modifying Factors:    o Anything that seems to make the condition worse?    -  n/a  o What have you tried to do to make the condition better?    -  n/a   Associated Signs and Symptoms:  Does this skin lesion seem to be associated with any of the following:      MELANOCYTIC NEVI ("Moles")    Physical Exam:   Anatomic Location Affected:   trunk and extremities   Morphological Description:  Scattered, 1-4mm round to ovoid, symmetrical-appearing, even bordered, skin colored to dark brown macules/papules   Pertinent Positives:   Pertinent Negatives: Additional History of Present Condition:      Assessment and Plan:  Based on a thorough discussion of this condition and the management approach to it (including a comprehensive discussion of the known risks, side effects and potential benefits of treatment), the patient (family) agrees to implement the following specific plan:   When outside we recommend using a wide brim hat, sunglasses, long sleeve and pants, sunscreen with SPF 21+ with reapplication every 2 hours, or SPF specific clothing    Benign, reassured   Annual skin check    LENTIGO    Physical Exam:   Anatomic Location Affected:  Mostly sun exposed areas   Morphological Description:  Light brown macules   Pertinent Positives:   Pertinent Negatives: Additional History of Present Condition:      Assessment and Plan:  Based on a thorough discussion of this condition and the management approach to it (including a comprehensive discussion of the known risks, side effects and potential benefits of treatment), the patient (family) agrees to implement the following specific plan:   When outside we recommend using a wide brim hat, sunglasses, long sleeve and pants, sunscreen with SPF 50+ with reapplication every 2 hours, or SPF specific clothing     DOMÍNGUEZ ANGIOMAS    Physical Exam:   Anatomic Location Affected:  trunk   Morphological Description:  Scattered cherry red, 1-4 mm papules   Pertinent Positives:   Pertinent Negatives:     Additional History of Present Condition:      Assessment and Plan:  Based on a thorough discussion of this condition and the management approach to it (including a comprehensive discussion of the known risks, side effects and potential benefits of treatment), the patient (family) agrees to implement the following specific plan:   Monitor for changes   Benign, reassured       NEUROFIBROMA  Physical Exam:   Anatomic Location Affected:  Left posterior buttock   Morphological Description:  1 cm pink pedunculated papule   Pertinent Positives:   Pertinent Negatives: Additional History of Present Condition:  Appeared within the last 2 years  Assessment and Plan:  Based on a thorough discussion of this condition and the management approach to it (including a comprehensive discussion of the known risks, side effects and potential benefits of treatment), the patient (family) agrees to implement the following specific plan:   Reassured benign   Recommendation is if the lesion becomes bothersome or is getting bigger it can be removed in the office        Scribe Attestation    I,:  Jeanette Burnette am acting as a scribe while in the presence of the attending physician :       I,:  Rosales Barnard MD personally performed the services described in this documentation    as scribed in my presence :

## 2022-05-24 NOTE — PATIENT INSTRUCTIONS
MELANOCYTIC NEVI ("Moles")    Assessment and Plan:  Based on a thorough discussion of this condition and the management approach to it (including a comprehensive discussion of the known risks, side effects and potential benefits of treatment), the patient (family) agrees to implement the following specific plan:  When outside we recommend using a wide brim hat, sunglasses, long sleeve and pants, sunscreen with SPF 13+ with reapplication every 2 hours, or SPF specific clothing   Benign, reassured  Annual skin check     Melanocytic Nevi  Melanocytic nevi ("moles") are tan or brown, raised or flat areas of the skin which have an increased number of melanocytes  Melanocytes are the cells in our body which make pigment and account for skin color  Some moles are present at birth (I e , "congenital nevi"), while others come up later in life (i e , "acquired nevi")  The sun can stimulate the body to make more moles  Sunburns are not the only thing that triggers more moles  Chronic sun exposure can do it too  Clinically distinguishing a healthy mole from melanoma may be difficult, even for experienced dermatologists  The "ABCDE's" of moles have been suggested as a means of helping to alert a person to a suspicious mole and the possible increased risk of melanoma  The suggestions for raising alert are as follows:    Asymmetry: Healthy moles tend to be symmetric, while melanomas are often asymmetric  Asymmetry means if you draw a line through the mole, the two halves do not match in color, size, shape, or surface texture  Asymmetry can be a result of rapid enlargement of a mole, the development of a raised area on a previously flat lesion, scaling, ulceration, bleeding or scabbing within the mole  Any mole that starts to demonstrate "asymmetry" should be examined promptly by a board certified dermatologist      Border: Healthy moles tend to have discrete, even borders    The border of a melanoma often blends into the normal skin and does not sharply delineate the mole from normal skin  Any mole that starts to demonstrate "uneven borders" should be examined promptly by a board certified dermatologist      Color: Healthy moles tend to be one color throughout  Melanomas tend to be made up of different colors ranging from dark black, blue, white, or red  Any mole that demonstrates a color change should be examined promptly by a board certified dermatologist      Diameter: Healthy moles tend to be smaller than 0 6 cm in size; an exception are "congenital nevi" that can be larger  Melanomas tend to grow and can often be greater than 0 6 cm (1/4 of an inch, or the size of a pencil eraser)  This is only a guideline, and many normal moles may be larger than 0 6 cm without being unhealthy  Any mole that starts to change in size (small to bigger or bigger to smaller) should be examined promptly by a board certified dermatologist      Evolving: Healthy moles tend to "stay the same "  Melanomas may often show signs of change or evolution such as a change in size, shape, color, or elevation  Any mole that starts to itch, bleed, crust, burn, hurt, or ulcerate or demonstrate a change or evolution should be examined promptly by a board certified dermatologist         Dash Lev and Plan:  Based on a thorough discussion of this condition and the management approach to it (including a comprehensive discussion of the known risks, side effects and potential benefits of treatment), the patient (family) agrees to implement the following specific plan:  When outside we recommend using a wide brim hat, sunglasses, long sleeve and pants, sunscreen with SPF 65+ with reapplication every 2 hours, or SPF specific clothing       What is a lentigo? A lentigo is a pigmented flat or slightly raised lesion with a clearly defined edge  Unlike an ephelis (freckle), it does not fade in the winter months  There are several kinds of lentigo    The name lentigo originally referred to its appearance resembling a small lentil  The plural of lentigo is lentigines, although lentigos is also in common use  Who gets lentigines? Lentigines can affect males and females of all ages and races  Solar lentigines are especially prevalent in fair skinned adults  Lentigines associated with syndromes are present at birth or arise during childhood  What causes lentigines? Common forms of lentigo are due to exposure to ultraviolet radiation:  Sun damage including sunburn   Indoor tanning   Phototherapy, especially photochemotherapy (PUVA)    Ionizing radiation, eg radiation therapy, can also cause lentigines  Several familial syndromes associated with widespread lentigines originate from mutations in Brennan-MAP kinase, mTOR signaling and PTEN pathways  What is the treatment for lentigines? Most lentigines are left alone  Attempts to lighten them may not be successful  The following approaches are used:  SPF 50+ broad-spectrum sunscreen   Hydroquinone bleaching cream   Alpha hydroxy acids   Vitamin C   Retinoids   Azelaic acid   Chemical peels  Individual lesions can be permanently removed using:  Cryotherapy   Intense pulsed light   Pigment lasers    How can lentigines be prevented? Lentigines associated with exposure ultraviolet radiation can be prevented by very careful sun protection  Clothing is more successful at preventing new lentigines than are sunscreens  What is the outlook for lentigines? Lentigines usually persist  They may increase in number with age and sun exposure  Some in sun-protected sites may fade and disappear      DOMÍNGUEZ ANGIOMAS    Assessment and Plan:  Based on a thorough discussion of this condition and the management approach to it (including a comprehensive discussion of the known risks, side effects and potential benefits of treatment), the patient (family) agrees to implement the following specific plan:  Monitor for changes  Benign, reassured      Assessment and Plan:    Cherry angioma, also known as [employer] spots, are benign vascular skin lesions  A "cherry angioma" is a firm red, blue or purple papule, 0 1-1 cm in diameter  When thrombosed, they can appear black in colour until evaluated with a dermatoscope when the red or purple colour is more easily seen  Cherry angioma may develop on any part of the body but most often appear on the scalp, face, lips and trunk  An angioma is due to proliferating endothelial cells; these are the cells that line the inside of a blood vessel  Angiomas can arise in early life or later in life; the most common type of angioma is a cherry angioma  Cherry angiomas are very common in males and females of any age or race  They are more noticeable in white skin than in skin of colour  They markedly increase in number from about the age of 36  There may be a family history of similar lesions  Eruptive cherry angiomas have been rarely reported to be associated with internal malignancy  The cause of angiomas is unknown  Genetic analysis of cherry angiomas has shown that they frequently carry specific somatic missense mutations in the GNAQ and GNA11 (Q209H) genes, which are involved in other vascular and melanocytic proliferations  HEMANGIOMA       Assessment and Plan:  Based on a thorough discussion of this condition and the management approach to it (including a comprehensive discussion of the known risks, side effects and potential benefits of treatment), the patient (family) agrees to implement the following specific plan:  Reassured benign  What Are Hemangiomas? Infantile hemangiomas are collections of extra blood vessels in the skin  They are benign (i e , they are not cancerous) and most often appear during the first few weeks of life  Hemangiomas can look different depending on where they are in the skin      Superficial hemangiomas are bright red and bumpy, often referred to as Claire Boyd because of their resemblance to the surface of a strawberry  Superficial hemangiomas can begin as small white, pink, or red areas on the skin that quickly change into the more obvious bright red, raised lesions  Deep hemangiomas occur under the skin and have a smooth surface, often with a bluish coloration  Some hemangiomas are combinations of superficial and deep lesions  Hemangiomas that are truly present at birth are usually slightly different; these are called congenital hemangiomas and typically follow a different course than described below  Typical Course  Infantile hemangiomas follow a fairly predictable course  There is a period of rapid growth/expansion in the first 2-3 months of life, which rarely goes beyond 10months of age  Deep hemangiomas can sometimes grow longer  Between 1018 months of age, most hemangiomas begin to slowly improve, a process called involution   The hemangioma will become less red, greyer, softer and flatter  Improvement in the hemangioma takes many years  About half of all hemangiomas will be considerably better by about 11years of age  Some others will continue to improve with time  The vast majority of hemangiomas are significantly improved by 8years of age  Though it is difficult to predict how any individual hemangioma will evolve, it is important to remember this natural course, as most hemangiomas do not require treatment and resolve on their own with time  Rare Cases  Although most hemangiomas do not cause any problems, there can be rare complications such as bleeding or ulceration (breakdown in the skin of the hemangioma)  While many parents worry about hemangiomas bursting and bleeding, they usually only bleed if ulcerated  The bleeding may be rapid, but usually only lasts a short time  Bleeding typically stops with gentle, continuous pressure for 15 minutes  Hemangiomas generally do not cause any pain unless they ulcerate      A minority of hemangiomas can cause more serious concerns: Depending on the location and size of the hemangioma, some may interfere with eating, vision, hearing or breathing, or may be associated with other medical problems  There can also be significant concerns about long-term cosmetic outcomes, especially for hemangiomas on the face  DOES MY CHILD'S HEMANGIOMA NEED TO BE TREATED? The decision whether to treat the hemangioma is determined by the age of the patient, size and location of the hemangioma, how rapidly it is growing, and whether it is likely to cause any prob  lems  There are 3 main indications for treatment:  A medical complication   Ulceration   Causing or threatening to cause disfigurement or scarring by distorting the normal shape and size of the affected area of skin    POSSIBLE TREATMENT OPTIONS    Localized Treatments:   Topical beta-blocker  A topical medication, such as timolol, is applied only to the hemangioma  This can help prevent growth, and sometimes shrink and fade small superficial hemangiomas  Topical steroid  Topical steroids can also help prevent the growth of small, thin hermangiomas  However, they aren't as frequently used as timolol (topical beta-blocker), which is usually a more effective option  Steroid injection  Steroid can be injected directly into the hemangioma to help slow its growth  This works best for smaller, localized hemangiomas  Systemic Treatments:   Propranolol is a medication given by mouth that is now used commonly for the treatment of problematic hemangiomas  It has been used for many years to treat high blood pressure  It must be used with caution because it can cause a drop in blood sugar if the baby taking it does not eat regularly  It also may cause a drop in blood pressure or heart rate  Close observation with your doctor is necessary        Oral steroids have been largely replaced by safer and more effective options, but are still used in select cases, which will be determined by your doctor  Other Treatments:  Laser treatment  Lasers may be helpful to stop bleeding hemangiomas or to help heal ulcerated  hemangiomas  They may also help to remove some of the redness or residual textural change that may be left behind after the hemangioma improves  Surgery  Surgery is usually reserved for smaller hemangiomas that are in an area where problems may arise or for small hemangiomas that ulcerate  Surgery can also be used to repair residual cosmetic defects such as excess skin or scarring  Because surgery will always leave a scar (and because most hemangiomas get better with time), early surgery should be reserved only for a small minority of cases  NEUROFIBROMA    Assessment and Plan:  Based on a thorough discussion of this condition and the management approach to it (including a comprehensive discussion of the known risks, side effects and potential benefits of treatment), the patient (family) agrees to implement the following specific plan:  Reassured benign  Recommendation is if the lesion becomes bothersome or is getting bigger it can be removed in the office

## 2022-09-01 ENCOUNTER — TELEPHONE (OUTPATIENT)
Dept: GASTROENTEROLOGY | Facility: CLINIC | Age: 47
End: 2022-09-01

## 2022-09-01 ENCOUNTER — PREP FOR PROCEDURE (OUTPATIENT)
Dept: GASTROENTEROLOGY | Facility: CLINIC | Age: 47
End: 2022-09-01

## 2022-09-01 DIAGNOSIS — Z12.11 SCREENING FOR COLON CANCER: Primary | ICD-10-CM

## 2022-09-01 NOTE — TELEPHONE ENCOUNTER
[facility] Assessment    Name: Doug Betancourt  YOB: 1975  Last Height: 5' 3 5" (1 613 m)  Last weight: 90 3 kg (199 lb)  BMI: 34 70 kg/m²  Procedure: Colonoscopy  Diagnosis: Screening  Date of procedure: 9/16/22  Prep: Alton/Mag  Responsible : Spouse/Edmar  Phone#: [phone]  Name completing form: Yazmin Irvin  Date form completed: 09/01/22      If the patient answers yes to any of these questions, schedule in a hospital  Are you pregnant: No  Do you rely on a wheelchair for mobility: No  Have you been diagnosed with End Stage Renal Disease (ESRD): No  Do you need oxygen during the day: No  Have you had a heart attack or stroke within the past three months: No  Have you had a seizure within the past three months: No  Have you ever been informed by anesthesia that you have a difficult airway: No  Additional Questions  Have you had any cardiac testing or are under the care of a Cardiologist (see cardiac list): No  Cardiac list:   Do you have an implanted cardiac defibrillator: No (Comment:  This patient should be scheduled in the hospital)    Have any bleeding problems, such as anemia or hemophilia (If patient has H&H result below 8, schedule in hospital   H&H must be within 30 days of procedure): No    Had an organ transplant within the past 3 months: No    Do you have any present infections: No  Do you get short of breath when walking a few blocks: No  Have you been diagnosed with diabetes: No  Comments (provide cardiac provider information if applicable):

## 2022-09-01 NOTE — TELEPHONE ENCOUNTER
Scheduled date of colonoscopy (as of today):9/16/22    Physician performing colonoscopy:Noel    Location of colonoscopy:[facility]    Bowel prep reviewed with patient: Alton/Mag    Instructions reviewed with patient by:NOELLE    Clearances: NA

## 2022-09-15 ENCOUNTER — NURSE TRIAGE (OUTPATIENT)
Dept: OTHER | Facility: OTHER | Age: 47
End: 2022-09-15

## 2022-09-15 NOTE — TELEPHONE ENCOUNTER
Regarding: Prep instruction needed  ----- Message from Bethanie Koch sent at 9/15/2022  3:40 PM EDT -----  " I have colonoscopy tomorrow and I need prep instructions since some of the prep has been recalled "

## 2022-09-15 NOTE — TELEPHONE ENCOUNTER
Patient called in for substitution for magnesium citrate for her bowel prep today  Advised patient of Dulcolax 5 mg tablets x 4; take 2 tablets between 2-5 PM and 2 tablets at 8 PM  Drink one half of Miralax and Gatorade mixture between 6-7 PM and balance 5 hours prior to her procedure  Recall was in June 2022; no update when product will be available  Reason for Disposition   Bowel prep for colonoscopy, questions about    Answer Assessment - Initial Assessment Questions  1  DATE/TIME: "When did you have your colonoscopy?"       Scheduled for 9/16/22    2   MAIN CONCERN: "What is your main concern right now?" "What questions do you have?"     Substitution for magnesium citrate    Protocols used: COLONOSCOPY SYMPTOMS AND QUESTIONS-ADULT-

## 2022-09-16 ENCOUNTER — ANESTHESIA (OUTPATIENT)
Dept: GASTROENTEROLOGY | Facility: AMBULATORY SURGERY CENTER | Age: 47
End: 2022-09-16

## 2022-09-16 ENCOUNTER — ANESTHESIA EVENT (OUTPATIENT)
Dept: GASTROENTEROLOGY | Facility: AMBULATORY SURGERY CENTER | Age: 47
End: 2022-09-16

## 2022-09-16 ENCOUNTER — HOSPITAL ENCOUNTER (OUTPATIENT)
Dept: GASTROENTEROLOGY | Facility: AMBULATORY SURGERY CENTER | Age: 47
Discharge: HOME | End: 2022-09-16
Payer: COMMERCIAL

## 2022-09-16 VITALS
HEIGHT: 63 IN | WEIGHT: 195 LBS | OXYGEN SATURATION: 98 % | TEMPERATURE: 96.8 F | RESPIRATION RATE: 18 BRPM | SYSTOLIC BLOOD PRESSURE: 113 MMHG | BODY MASS INDEX: 34.55 KG/M2 | HEART RATE: 60 BPM | DIASTOLIC BLOOD PRESSURE: 56 MMHG

## 2022-09-16 DIAGNOSIS — Z12.11 SCREENING FOR COLON CANCER: ICD-10-CM

## 2022-09-16 LAB
EXT PREGNANCY TEST URINE: NEGATIVE
EXT. CONTROL: NORMAL

## 2022-09-16 PROCEDURE — 45380 COLONOSCOPY AND BIOPSY: CPT | Performed by: INTERNAL MEDICINE

## 2022-09-16 PROCEDURE — 88305 TISSUE EXAM BY PATHOLOGIST: CPT | Performed by: PATHOLOGY

## 2022-09-16 RX ORDER — SODIUM CHLORIDE, SODIUM LACTATE, POTASSIUM CHLORIDE, CALCIUM CHLORIDE 600; 310; 30; 20 MG/100ML; MG/100ML; MG/100ML; MG/100ML
INJECTION, SOLUTION INTRAVENOUS CONTINUOUS PRN
Status: DISCONTINUED | OUTPATIENT
Start: 2022-09-16 | End: 2022-09-16

## 2022-09-16 RX ORDER — PROPOFOL 10 MG/ML
INJECTION, EMULSION INTRAVENOUS AS NEEDED
Status: DISCONTINUED | OUTPATIENT
Start: 2022-09-16 | End: 2022-09-16

## 2022-09-16 RX ADMIN — PROPOFOL 30 MG: 10 INJECTION, EMULSION INTRAVENOUS at 11:07

## 2022-09-16 RX ADMIN — PROPOFOL 30 MG: 10 INJECTION, EMULSION INTRAVENOUS at 11:05

## 2022-09-16 RX ADMIN — PROPOFOL 100 MG: 10 INJECTION, EMULSION INTRAVENOUS at 11:00

## 2022-09-16 RX ADMIN — PROPOFOL 30 MG: 10 INJECTION, EMULSION INTRAVENOUS at 11:03

## 2022-09-16 RX ADMIN — PROPOFOL 20 MG: 10 INJECTION, EMULSION INTRAVENOUS at 11:12

## 2022-09-16 RX ADMIN — PROPOFOL 30 MG: 10 INJECTION, EMULSION INTRAVENOUS at 11:10

## 2022-09-16 RX ADMIN — SODIUM CHLORIDE, SODIUM LACTATE, POTASSIUM CHLORIDE, CALCIUM CHLORIDE: 600; 310; 30; 20 INJECTION, SOLUTION INTRAVENOUS at 10:51

## 2022-09-16 NOTE — ANESTHESIA PREPROCEDURE EVALUATION
Procedure:  COLONOSCOPY    Relevant Problems   CARDIO   (+) Migraine headache   (+) Mixed hyperlipidemia      MUSCULOSKELETAL   (+) Chronic low back pain      NEURO/PSYCH   (+) Chronic low back pain   (+) History of COVID-19   (+) Migraine headache   (+) Mild episode of recurrent major depressive disorder (HCC)   (+) Seasonal affective disorder (HCC)        Physical Exam    Airway    Mallampati score: III  TM Distance: >3 FB  Neck ROM: full     Dental   No notable dental hx     Cardiovascular  Cardiovascular exam normal    Pulmonary  Pulmonary exam normal     Other Findings        Anesthesia Plan  ASA Score- 2     Anesthesia Type- IV sedation with anesthesia with ASA Monitors  Additional Monitors:   Airway Plan:           Plan Factors-Exercise tolerance (METS): >4 METS  Chart reviewed  Patient summary reviewed  Patient is not a current smoker  Induction- intravenous  Postoperative Plan-     Informed Consent- Anesthetic plan and risks discussed with patient

## 2022-09-16 NOTE — ANESTHESIA POSTPROCEDURE EVALUATION
Post-Op Assessment Note    CV Status:  Stable    Pain management: adequate     Mental Status:  Lethargic   Hydration Status:  Euvolemic   PONV Controlled:  Controlled   Airway Patency:  Patent      Post Op Vitals Reviewed: Yes      Staff: CRNA         No complications documented      BP   112/70   Temp  98   Pulse  76   Resp   18   SpO2   99

## 2022-09-26 NOTE — RESULT ENCOUNTER NOTE
Please call the patient regarding her result  Polyp was a benign adenoma    Recommend repeat colonoscopy in 5 years

## 2023-04-04 NOTE — ASSESSMENT & PLAN NOTE
Plan is to start low dose generic lexapro 10 mg 1/2 once daily and recheck in 4 weeks  Check to see if Andres Mo is still practicing therapy at our office  Reason for Call:  Form, our goal is to have forms completed with 72 hours, however, some forms may require a visit or additional information.    Type of letter, form or note:  FMLA    Who is the form from?: Patient    Where did the form come from: Patient or family brought in       What clinic location was the form placed at?: [place]    Where the form was placed: Given to MA/RN    What number is listed as a contact on the form?: [phone]       Additional comments: FMLA forms from work     Call taken on 4/4/2023 at 11:32 AM by Ellyn Brito

## 2023-05-10 ENCOUNTER — VBI (OUTPATIENT)
Dept: ADMINISTRATIVE | Facility: OTHER | Age: 48
End: 2023-05-10

## 2023-08-14 ENCOUNTER — VBI (OUTPATIENT)
Dept: ADMINISTRATIVE | Facility: OTHER | Age: 48
End: 2023-08-14

## 2023-08-30 ENCOUNTER — OFFICE VISIT (OUTPATIENT)
Dept: URGENT CARE | Facility: CLINIC | Age: 48
End: 2023-08-30

## 2023-08-30 VITALS
SYSTOLIC BLOOD PRESSURE: 122 MMHG | BODY MASS INDEX: 34.55 KG/M2 | TEMPERATURE: 97.2 F | HEART RATE: 72 BPM | HEIGHT: 63 IN | OXYGEN SATURATION: 99 % | RESPIRATION RATE: 16 BRPM | WEIGHT: 195 LBS | DIASTOLIC BLOOD PRESSURE: 76 MMHG

## 2023-08-30 DIAGNOSIS — G57.62 MORTON'S NEUROMA OF LEFT FOOT: Primary | ICD-10-CM

## 2023-08-30 RX ORDER — PREDNISONE 20 MG/1
20 TABLET ORAL 2 TIMES DAILY WITH MEALS
Qty: 8 TABLET | Refills: 0 | Status: SHIPPED | OUTPATIENT
Start: 2023-08-30

## 2023-08-30 NOTE — PROGRESS NOTES
[facility]        NAME: Naun Avila is a 46 y.o. female  : 1975    MRN: [id number]  DATE: 2023  TIME: 7:01 PM    Assessment and Plan   Dillon's neuroma of left foot [G57.62]  1. Dillon's neuroma of left foot  predniSONE 20 mg tablet            Patient Instructions     Recommend seeing podiatry. Likely has the start of a mortons neuroma. Follow up with PCP in 3-5 days if no improvement. Proceed to  ER if symptoms worsen. Chief Complaint     Chief Complaint   Patient presents with   • Foot Pain     Pt reports that she has had foot pain since Sun- which started with athletes foot. She reports that the pain has become progressively worse over the last few days and now she noticed that it is swollen and painful to touch. History of Present Illness       46year old female presents today with left foot pain. She states the pain is worse with ambulation. Notes some swelling at the distal portion of her midfoot. She denies any injuries. Review of Systems   Review of Systems   Constitutional: Negative for chills, fatigue and fever. HENT: Negative for postnasal drip and sore throat. Respiratory: Negative for cough and shortness of breath. Cardiovascular: Negative for chest pain and palpitations. Gastrointestinal: Negative for abdominal pain, nausea and vomiting. Genitourinary: Negative for dysuria. Musculoskeletal: Positive for gait problem and joint swelling. Skin: Negative for rash. Neurological: Negative for dizziness, syncope, light-headedness, numbness and headaches. Psychiatric/Behavioral: Negative for agitation and confusion. All other systems reviewed and are negative.         Current Medications       Current Outpatient Medications:   •  predniSONE 20 mg tablet, Take 1 tablet (20 mg total) by mouth 2 (two) times a day with meals, Disp: 8 tablet, Rfl: 0  •  Cetirizine HCl 10 MG CAPS, Take by mouth, Disp: , Rfl:   •  escitalopram (LEXAPRO) 10 mg tablet, Take 1 tablet (10 mg total) by mouth daily (Patient not taking: Reported on 8/8/2023), Disp: 30 tablet, Rfl: 11  •  hydrocortisone 2.5 % cream, Apply topically 2 (two) times a day as needed for irritation or rash (with itchy rashes) for up to 14 days, Disp: 30 g, Rfl: 1  •  Multiple Vitamin (DAILY VALUE MULTIVITAMIN PO), Take 1 tablet by mouth daily, Disp: , Rfl:   •  Probiotic Product (PROBIOTIC ADVANCED PO), Take by mouth (Patient not taking: Reported on 8/8/2023), Disp: , Rfl:     Current Allergies     Allergies as of 08/30/2023 - Reviewed 08/30/2023   Allergen Reaction Noted   • Gluten meal - food allergy  11/20/2015   • Wheat bran - food allergy  11/20/2015   • Bupropion Anxiety 07/14/2021            The following portions of the patient's history were reviewed and updated as appropriate: allergies, current medications, past family history, past medical history, past social history, past surgical history and problem list.     Past Medical History:   Diagnosis Date   • Chicken pox 03/04/2015    Last assessed   • Depression    • Heartburn     occasional - over the last year   • History of vertigo 2017   • Ocular migraine     last on 2 weeks ago   • TMJ disease     mild       Past Surgical History:   Procedure Laterality Date   • PILONIDAL CYST EXCISION     • WISDOM TOOTH EXTRACTION         Family History   Problem Relation Age of Onset   • No Known Problems Mother    • Heart disease Father    • Diabetes Father    • Hyperlipidemia Father    • Parkinsonism Father          Medications have been verified. Objective   /76   Pulse 72   Temp (!) 97.2 °F (36.2 °C)   Resp 16   Ht 5' 3" (1.6 m)   Wt 88.5 kg (195 lb)   SpO2 99%   BMI 34.54 kg/m²   No LMP recorded. Physical Exam     Physical Exam  Vitals reviewed. Constitutional:       General: She is not in acute distress. Appearance: Normal appearance. She is not ill-appearing. HENT:      Head: Normocephalic and atraumatic.    Eyes: Extraocular Movements: Extraocular movements intact. Conjunctiva/sclera: Conjunctivae normal.   Musculoskeletal:         General: Tenderness (interdigital space between the 3rd and 4th toes on the left foot.) present. Cervical back: Normal range of motion. Skin:     General: Skin is warm. Neurological:      General: No focal deficit present. Mental Status: She is alert.    Psychiatric:         Mood and Affect: Mood normal.         Behavior: Behavior normal.         Judgment: Judgment normal.

## 2023-09-12 ENCOUNTER — TELEPHONE (OUTPATIENT)
Age: 48
End: 2023-09-12

## 2023-09-12 NOTE — TELEPHONE ENCOUNTER
Called pt. No answer. Left message to call us back. Dr. Tami Rivera will not be available on 10/18 at 8:30 am for the scheduled appt. We need to cancel this appt and reschedule appointment. Requested the pt call us back to reschedule.

## 2023-10-08 ENCOUNTER — OFFICE VISIT (OUTPATIENT)
Dept: URGENT CARE | Facility: CLINIC | Age: 48
End: 2023-10-08
Payer: COMMERCIAL

## 2023-10-08 VITALS
TEMPERATURE: 97 F | OXYGEN SATURATION: 96 % | DIASTOLIC BLOOD PRESSURE: 75 MMHG | HEART RATE: 72 BPM | RESPIRATION RATE: 18 BRPM | SYSTOLIC BLOOD PRESSURE: 133 MMHG

## 2023-10-08 DIAGNOSIS — L03.119 CELLULITIS OF FOOT: Primary | ICD-10-CM

## 2023-10-08 PROCEDURE — 99213 OFFICE O/P EST LOW 20 MIN: CPT

## 2023-10-08 RX ORDER — CEPHALEXIN 500 MG/1
500 CAPSULE ORAL EVERY 6 HOURS SCHEDULED
Qty: 28 CAPSULE | Refills: 0 | Status: SHIPPED | OUTPATIENT
Start: 2023-10-08 | End: 2023-10-15

## 2023-10-08 NOTE — LETTER
October 8, 2023     Patient: Enzo Olmos   YOB: 1975   Date of Visit: 10/8/2023       To Whom it May Concern:    Michelle Jackman was seen in my clinic on 10/8/2023. She may return on 10/10/2023    If you have any questions or concerns, please don't hesitate to call.          Sincerely,          MERLE Rivera        CC: No Recipients

## 2023-10-08 NOTE — PATIENT INSTRUCTIONS
Please begin antibiotics as directed. May continue warm soaks as desired. Follow up with Podiatry in two days as planned. Present to ED if you notice fever, worsening symptoms, no improvement after 48-72 hours on antibiotics.

## 2023-10-08 NOTE — PROGRESS NOTES
[facility]        NAME: Janell Holman is a 46 y.o. female  : 1975    MRN: [id number]  DATE: 2023  TIME: 3:09 PM    Assessment and Plan   Cellulitis of foot [L03.119]  1. Cellulitis of foot  cephalexin (KEFLEX) 500 mg capsule      1. Please begin antibiotics as directed. 2. May continue warm soaks as desired. 3. Follow up with Podiatry in two days as planned. 4.   Present to ED if you notice fever, worsening symptoms, no improvement after 48-72 hours on antibiotics. Patient Instructions   Cellulitis   WHAT YOU NEED TO KNOW:   Cellulitis is a skin infection caused by bacteria. Cellulitis is common and can become severe. Cellulitis usually appears on the lower legs. It can also appear on the arms, face, and other areas. Cellulitis develops when bacteria enter a crack or break in your skin, such as a scratch, bite, or cut.        DISCHARGE INSTRUCTIONS:   Return to the emergency department if:   • Your wound gets larger and more painful.     • You feel a crackling under your skin when you touch it.     • You have purple dots or bumps on your skin, or you see bleeding under your skin.     • You see red streaks coming from the infected area.     Call your doctor if:   • The red, warm, swollen area gets larger.     • Your fever or pain does not go away or gets worse.     • The area does not get smaller after 3 days of antibiotics.     • You have questions or concerns about your condition or care.     Medicines: You should start to see improvement in 3 days. If cellulitis is not treated, the infection can spread through your body and become life-threatening. You may need any of the following medicines:  • Antibiotics  help treat a bacterial infection.     • Acetaminophen  decreases pain and fever. It is available without a doctor's order. Ask how much to take and how often to take it. Follow directions.  Read the labels of all other medicines you are using to see if they also contain acetaminophen, or ask your doctor or pharmacist. Acetaminophen can cause liver damage if not taken correctly.     • NSAIDs , such as ibuprofen, help decrease swelling, pain, and fever. This medicine is available with or without a doctor's order. NSAIDs can cause stomach bleeding or kidney problems in certain people. If you take blood thinner medicine, always ask your healthcare provider if NSAIDs are safe for you. Always read the medicine label and follow directions.     • Take your medicine as directed. Contact your healthcare provider if you think your medicine is not helping or if you have side effects. Tell your provider if you are allergic to any medicine. Keep a list of the medicines, vitamins, and herbs you take. Include the amounts, and when and why you take them. Bring the list or the pill bottles to follow-up visits. Carry your medicine list with you in case of an emergency.     Self-care:   • Wash the area with soap and water every day. Gently pat dry. Use bandages if directed by your healthcare provider.     • Apply cream or ointment as directed. These help protect the area. Most over-the-counter products, such as petroleum jelly, are good to use. Ask your healthcare provider about specific creams or ointments you should use.     • Place a cool, damp cloth on the area. Use clean cloths and clean water. You can do this as often as you need to. Cool, damp cloths may help decrease pain.     • Elevate the area above the level of your heart  as often as you can. This will help decrease swelling and pain. Prop the area on pillows or blankets to keep it elevated comfortably.        Prevent cellulitis:   • Do not scratch bug bites or areas of injury. You increase your risk for cellulitis by scratching these areas.     • Do not share personal items, such as towels, clothing, and razors.     • Clean exercise equipment  with germ-killing  before and after you use it.     • Treat athlete's foot. This can help prevent the spread of a bacterial skin infection.     • Wash your hands often. Use soap and water. Wash your hands after you use the bathroom, change a child's diapers, or sneeze. Wash your hands before you prepare or eat food. Use lotion to prevent dry, cracked skin.        Follow up with your doctor within 3 days, or as directed:  He or she will check if your cellulitis is getting better. Write down your questions so you remember to ask them during your visits. © Copyright [employer] 2023 Information is for End User's use only and may not be sold, redistributed or otherwise used for commercial purposes. The above information is an  only. It is not intended as medical advice for individual conditions or treatments. Talk to your doctor, nurse or pharmacist before following any medical regimen to see if it is safe and effective for you. Follow up with PCP in 3-5 days. Proceed to  ER if symptoms worsen. Chief Complaint   No chief complaint on file. History of Present Illness       46year old female with no significant PMH presents for evaluation of wound between third and fourth toes of left foot. She has recently been seen in this clinic and by podiatry for left foot metatarsalgia and suspected Dillon's neuroma, was treated outoatient with a steroid infection. However, she reports that the tender area between these two toes has become progressively swollen and purple in color. She also notes swelling of her fourth toe and notes a small pocket of what appears to be pus at the base of the area. She denies known trauma or injury (but suspects that this began with athlete's foot), fever, drainage, numbness/tingling. She has a follow up appointment with podiatry in two days. Review of Systems   Review of Systems   Constitutional: Negative for fatigue and fever.    HENT: Negative for congestion, ear discharge, ear pain, postnasal drip, rhinorrhea, sinus pressure, sinus pain, sneezing and sore throat. Eyes: Negative. Negative for pain, discharge, redness and itching. Respiratory: Negative. Negative for apnea, cough, choking, chest tightness, shortness of breath and stridor. Cardiovascular: Negative. Negative for chest pain and palpitations. Gastrointestinal: Negative. Negative for diarrhea, nausea and vomiting. Endocrine: Negative. Negative for polydipsia, polyphagia and polyuria. Genitourinary: Negative. Negative for decreased urine volume and flank pain. Musculoskeletal: Negative. Negative for arthralgias, back pain, myalgias, neck pain and neck stiffness. Skin: Positive for color change and wound. Negative for pallor and rash. Allergic/Immunologic: Negative. Negative for environmental allergies. Neurological: Negative. Negative for dizziness, facial asymmetry, light-headedness, numbness and headaches. Hematological: Negative. Negative for adenopathy. Psychiatric/Behavioral: Negative.           Current Medications       Current Outpatient Medications:   •  cephalexin (KEFLEX) 500 mg capsule, Take 1 capsule (500 mg total) by mouth every 6 (six) hours for 7 days, Disp: 28 capsule, Rfl: 0  •  Cetirizine HCl 10 MG CAPS, Take by mouth, Disp: , Rfl:   •  escitalopram (LEXAPRO) 10 mg tablet, Take 1 tablet (10 mg total) by mouth daily (Patient not taking: Reported on 8/8/2023), Disp: 30 tablet, Rfl: 11  •  hydrocortisone 2.5 % cream, Apply topically 2 (two) times a day as needed for irritation or rash (with itchy rashes) for up to 14 days, Disp: 30 g, Rfl: 1  •  Multiple Vitamin (DAILY VALUE MULTIVITAMIN PO), Take 1 tablet by mouth daily, Disp: , Rfl:   •  predniSONE 20 mg tablet, Take 1 tablet (20 mg total) by mouth 2 (two) times a day with meals, Disp: 8 tablet, Rfl: 0  •  Probiotic Product (PROBIOTIC ADVANCED PO), Take by mouth (Patient not taking: Reported on 8/8/2023), Disp: , Rfl:     Current Allergies     Allergies as of 10/08/2023 - Reviewed 08/30/2023   Allergen Reaction Noted   • Gluten meal - food allergy  11/20/2015   • Wheat bran - food allergy  11/20/2015   • Bupropion Anxiety 07/14/2021            The following portions of the patient's history were reviewed and updated as appropriate: allergies, current medications, past family history, past medical history, past social history, past surgical history and problem list.     Past Medical History:   Diagnosis Date   • Chicken pox 03/04/2015    Last assessed   • Depression    • Heartburn     occasional - over the last year   • History of vertigo 2017   • Ocular migraine     last on 2 weeks ago   • TMJ disease     mild       Past Surgical History:   Procedure Laterality Date   • PILONIDAL CYST EXCISION     • WISDOM TOOTH EXTRACTION         Family History   Problem Relation Age of Onset   • No Known Problems Mother    • Heart disease Father    • Diabetes Father    • Hyperlipidemia Father    • Parkinsonism Father          Medications have been verified. Objective   /75   Pulse 72   Temp (!) 97 °F (36.1 °C)   Resp 18   SpO2 96%        Physical Exam     Physical Exam  Vitals and nursing note reviewed. Constitutional:       General: She is not in acute distress. Appearance: Normal appearance. She is not ill-appearing, toxic-appearing or diaphoretic. HENT:      Head: Normocephalic and atraumatic. Right Ear: External ear normal.      Left Ear: External ear normal.      Nose: Nose normal. No congestion or rhinorrhea. Mouth/Throat:      Mouth: Mucous membranes are moist.   Eyes:      Extraocular Movements: Extraocular movements intact. Conjunctiva/sclera: Conjunctivae normal.      Pupils: Pupils are equal, round, and reactive to light. Cardiovascular:      Rate and Rhythm: Normal rate and regular rhythm. Pulses: Normal pulses. Dorsalis pedis pulses are 2+ on the left side. Heart sounds: Normal heart sounds. No murmur heard. No friction rub.  No gallop. Pulmonary:      Effort: Pulmonary effort is normal. No respiratory distress. Breath sounds: Normal breath sounds. No stridor. No wheezing, rhonchi or rales. Abdominal:      General: Bowel sounds are normal.      Palpations: Abdomen is soft. Tenderness: There is no abdominal tenderness. There is no guarding or rebound. Musculoskeletal:         General: Normal range of motion. Cervical back: Normal range of motion and neck supple. No tenderness. Feet:    Feet:      Left foot:      Skin integrity: Blister, skin breakdown, erythema and fissure present. No warmth, callus or dry skin. Toenail Condition: Left toenails are normal.      Comments: Area of swelling, reddish/purple discoloration of interdigital webspace of third and fourth toes, noted swelling of fourth toe. Skin:     General: Skin is warm and dry. Capillary Refill: Capillary refill takes less than 2 seconds. Neurological:      General: No focal deficit present. Mental Status: She is alert and oriented to person, place, and time. Cranial Nerves: No cranial nerve deficit.    Psychiatric:         Mood and Affect: Mood normal.         Behavior: Behavior normal.

## 2023-11-30 ENCOUNTER — TELEPHONE (OUTPATIENT)
Dept: FAMILY MEDICINE CLINIC | Facility: CLINIC | Age: 48
End: 2023-11-30

## 2023-11-30 ENCOUNTER — OFFICE VISIT (OUTPATIENT)
Dept: FAMILY MEDICINE CLINIC | Facility: CLINIC | Age: 48
End: 2023-11-30
Payer: COMMERCIAL

## 2023-11-30 ENCOUNTER — APPOINTMENT (OUTPATIENT)
Dept: RADIOLOGY | Facility: CLINIC | Age: 48
End: 2023-11-30
Payer: COMMERCIAL

## 2023-11-30 VITALS
HEIGHT: 63 IN | TEMPERATURE: 98.5 F | SYSTOLIC BLOOD PRESSURE: 116 MMHG | DIASTOLIC BLOOD PRESSURE: 68 MMHG | BODY MASS INDEX: 35.97 KG/M2 | WEIGHT: 203 LBS | HEART RATE: 75 BPM | OXYGEN SATURATION: 96 %

## 2023-11-30 DIAGNOSIS — B34.9 VIRAL ILLNESS: ICD-10-CM

## 2023-11-30 DIAGNOSIS — M25.442 FINGER JOINT SWELLING, LEFT: Primary | ICD-10-CM

## 2023-11-30 DIAGNOSIS — M25.442 FINGER JOINT SWELLING, LEFT: ICD-10-CM

## 2023-11-30 LAB
SARS-COV-2 AG UPPER RESP QL IA: NEGATIVE
VALID CONTROL: NORMAL

## 2023-11-30 PROCEDURE — 73140 X-RAY EXAM OF FINGER(S): CPT

## 2023-11-30 PROCEDURE — 99214 OFFICE O/P EST MOD 30 MIN: CPT | Performed by: NURSE PRACTITIONER

## 2023-11-30 PROCEDURE — 87811 SARS-COV-2 COVID19 W/OPTIC: CPT | Performed by: NURSE PRACTITIONER

## 2023-11-30 NOTE — PROGRESS NOTES
Name: Burgess Hernandez      : 1975      MRN: [id number]  Encounter Provider: MERLE Tomas  Encounter Date: 2023   Encounter department: [place] [address] [address] [facility]     1. Finger joint swelling, left  Assessment & Plan:  Due to swelling and bruising at nail bed suspect distal fracture. Should have improved given one month ago so concerned for non healing. Will get xray continue to wrap and or immobilize with finger splint. Orders:  -     XR finger left fourth digit-ring; Future; Expected date: 2023    2. Viral illness  Comments:  covid negative. comfort care  Orders:  -     POCT Rapid Covid Ag         Subjective      Patient reports she started with cough and nasal congestion on Saturday, the cough became productive with white and yellow phlegm noted and last night she woke up not able to swallow so she did salt water gargles and is drinking hot water tea , lemon and honey   with some relief . She has been taking Mucinex for the cough and today started taking advil for sore throat pain was 9/10 and has improved to  5/10. Daughter was home from college and with cough. Denies having any fevers. Left ring finger with swelling and redness . Patient states that about a month ago her dog chain yanked her hand , denies any open areas, but notes that the the area has remained slightly swollen and that she wrapping it up has helped ease the swelling and pain. She also notes that the redness has decreased but the swelling and pain persists. Review of Systems   Constitutional:  Positive for appetite change and fatigue. Negative for chills and fever. HENT:  Positive for congestion, ear pain, postnasal drip, rhinorrhea, sneezing, sore throat, trouble swallowing and voice change. Negative for hearing loss, sinus pressure, sinus pain and tinnitus. Eyes:         Right eye with stye around the lower eye lid   Respiratory:  Positive for cough.  Negative for shortness of breath. Cardiovascular: Negative. Gastrointestinal:  Negative for abdominal pain, diarrhea, nausea and vomiting. Endocrine: Negative. Genitourinary: Negative. Musculoskeletal:  Positive for joint swelling. Negative for arthralgias, myalgias, neck pain and neck stiffness. Skin: Negative. Neurological: Negative. Hematological: Negative. Psychiatric/Behavioral: Negative. Current Outpatient Medications on File Prior to Visit   Medication Sig   • Cetirizine HCl 10 MG CAPS Take by mouth   • Multiple Vitamin (DAILY VALUE MULTIVITAMIN PO) Take 1 tablet by mouth daily   • escitalopram (LEXAPRO) 10 mg tablet Take 1 tablet (10 mg total) by mouth daily (Patient not taking: Reported on 8/8/2023)   • hydrocortisone 2.5 % cream Apply topically 2 (two) times a day as needed for irritation or rash (with itchy rashes) for up to 14 days   • predniSONE 20 mg tablet Take 1 tablet (20 mg total) by mouth 2 (two) times a day with meals (Patient not taking: Reported on 11/30/2023)   • Probiotic Product (PROBIOTIC ADVANCED PO) Take by mouth (Patient not taking: Reported on 8/8/2023)       Objective     /68 (BP Location: Left arm, Patient Position: Sitting, Cuff Size: Large)   Pulse 75   Temp 98.5 °F (36.9 °C) (Tympanic)   Ht 5' 3" (1.6 m)   Wt 92.1 kg (203 lb)   SpO2 96%   BMI 35.96 kg/m²     Physical Exam  Vitals reviewed. Constitutional:       Appearance: She is well-developed. She is obese. She is ill-appearing (mild). HENT:      Head: Normocephalic. Right Ear: No middle ear effusion. Left Ear: Tympanic membrane and ear canal normal.      Nose: Congestion present. Mouth/Throat:      Mouth: Mucous membranes are moist. No oral lesions. Pharynx: Uvula midline. No oropharyngeal exudate, posterior oropharyngeal erythema or uvula swelling. Cardiovascular:      Rate and Rhythm: Normal rate and regular rhythm.       Heart sounds: Normal heart sounds, S1 normal and S2 normal. Pulmonary:      Effort: Pulmonary effort is normal.      Breath sounds: Normal breath sounds. Musculoskeletal:      Left hand: Swelling (ecchymosis to the nail bed) and bony tenderness present. Decreased range of motion. Comments: Fourth finger lefthand    Neurological:      Mental Status: She is alert and oriented to person, place, and time.    Psychiatric:         Mood and Affect: Mood normal.         Behavior: Behavior normal.       MERLE Franco

## 2023-11-30 NOTE — TELEPHONE ENCOUNTER
I was calling to see if you have any appointments today because I think I have strep throat. My YOB: 1975 and my last name is Katarzyna Baldwin and I wonder if you can do a strep test there. But I'm guessing if you can't return this call soon, then I just need to go to Urgent Care [phone]. Thank you. Called patient back Left message on machine to call back to set up appointment.

## 2023-12-04 DIAGNOSIS — S62.315K: Primary | ICD-10-CM

## 2023-12-04 PROBLEM — M25.442 FINGER JOINT SWELLING, LEFT: Status: ACTIVE | Noted: 2023-12-04

## 2023-12-05 NOTE — ASSESSMENT & PLAN NOTE
Due to swelling and bruising at nail bed suspect distal fracture. Should have improved given one month ago so concerned for non healing. Will get xray continue to wrap and or immobilize with finger splint.

## 2023-12-06 ENCOUNTER — OFFICE VISIT (OUTPATIENT)
Dept: OBGYN CLINIC | Facility: CLINIC | Age: 48
End: 2023-12-06
Payer: COMMERCIAL

## 2023-12-06 ENCOUNTER — OFFICE VISIT (OUTPATIENT)
Dept: OCCUPATIONAL THERAPY | Facility: CLINIC | Age: 48
End: 2023-12-06
Payer: COMMERCIAL

## 2023-12-06 VITALS
SYSTOLIC BLOOD PRESSURE: 112 MMHG | BODY MASS INDEX: 35.97 KG/M2 | HEART RATE: 78 BPM | HEIGHT: 63 IN | DIASTOLIC BLOOD PRESSURE: 74 MMHG | WEIGHT: 203 LBS

## 2023-12-06 DIAGNOSIS — M20.019 MALLET DEFORMITY OF INDEX FINGER: Primary | ICD-10-CM

## 2023-12-06 DIAGNOSIS — M20.012 MALLET DEFORMITY OF LEFT RING FINGER: ICD-10-CM

## 2023-12-06 DIAGNOSIS — S62.665A CLOSED NONDISPLACED FRACTURE OF DISTAL PHALANX OF LEFT RING FINGER, INITIAL ENCOUNTER: ICD-10-CM

## 2023-12-06 DIAGNOSIS — M20.012 MALLET DEFORMITY OF LEFT RING FINGER: Primary | ICD-10-CM

## 2023-12-06 PROCEDURE — L3933 FO W/O JOINTS CF: HCPCS

## 2023-12-06 PROCEDURE — 99243 OFF/OP CNSLTJ NEW/EST LOW 30: CPT | Performed by: SURGERY

## 2023-12-06 RX ORDER — DICLOFENAC 16.05 MG/ML
SOLUTION TOPICAL
COMMUNITY
Start: 2023-09-07

## 2023-12-06 RX ORDER — FLUOCINOLONE ACETONIDE 0.25 MG/G
OINTMENT TOPICAL
COMMUNITY
Start: 2023-12-05

## 2023-12-06 RX ORDER — KETOCONAZOLE 20 MG/ML
SHAMPOO TOPICAL
COMMUNITY
Start: 2023-12-04

## 2023-12-06 NOTE — PROGRESS NOTES
Orthosis    Diagnosis:   1. Mallet deformity of index finger          Indication: Fracture    Location: Left  ring finger  Supplies: Custom Fit Orthotic  Orthosis type: Mallet/DIP Blocking  Wearing Schedule: Remove with Protected Technique Only as Needed  Describe Position: DIP in Hyperextension    Precautions: Fracture    Patient or Caregiver expresses understanding of wearing Schedule and Precautions? Yes  Patient or Caregiver able to don/doff orthotic independently? Yes    Written orders provided to patient?  Yes  Orders Obtained: Written  Orders Obtained from: Dr. Marcial Landry    Return for evaluation and treatment Yes

## 2023-12-06 NOTE — LETTER
December 8, 2023     [place], [address] [address]    Patient: Devante Andrew   YOB: 1975   Date of Visit: 12/6/2023       Dear Dr. Harjinder Maxwell: Thank you for referring Tita Vivas to me for evaluation. Below are my notes for this consultation. If you have questions, please do not hesitate to call me. I look forward to following your patient along with you. Sincerely,        Aurora Bedolal MD        CC: No Recipients    Aurora Bedolla MD  12/6/2023 12:01 PM  Peggy Bowie M.D. Attending, Orthopaedic Surgery  Hand, Wrist, and Elbow Surgery  [facility]      ORTHOPAEDIC HAND, WRIST, AND ELBOW OFFICE  VISIT       ASSESSMENT/PLAN:      46 y.o. female with left ring finger distal phalanx fracture, mallet deformity, DOI approx. 1 month ago week of 11/6/23     X-rays were reviewed. It was discussed with Sherri Talamantes that she injured her terminal extensor tendon which is why she has a slight droop to her DIP joint. She is also starting to develop a slight swan neck deformity. We discussed hyperextension splinting to try and correct the deformity. She would wear the splint 100 percent of the time for the next 6-8 weeks, even for hygiene purposes. After full time use she will be transitioned to nighttime use for another 3 weeks time. OT was ordered for 2 hyperextension cap splints. OT will show her how to properly exchange out the splints for hygiene purposes. I will see her back in 2-3 weeks time for a skin check. The patient verbalized understanding of exam findings and treatment plan. We engaged in the shared decision-making process and treatment options were discussed at length with the patient. Surgical and conservative management discussed today along with risks and benefits.     Diagnoses and all orders for this visit:    Mallet deformity of left ring finger  -     Ambulatory Referral to PT/OT Hand Therapy; Future    Closed nondisplaced fracture of distal phalanx of left ring finger, initial encounter  -     Ambulatory Referral to Hand Surgery  -     Ambulatory Referral to PT/OT Hand Therapy; Future    Other orders  -     ketoconazole (NIZORAL) 2 % shampoo  -     fluocinolone (SYNALAR) 0.025 % ointment  -     Diclofenac Sodium 1.5 % SOLN; APPLY 5 TO 10 DROPS TO THE AFFECTED AREA OF THE FOOT/ANKLE 3 TIMES A DAY        Follow Up:  Return for 2-3 weeks. To Do Next Visit:  Re-evaluation of current issue      General Discussions:  Fracture - Nonoperative Care: The physiology of a fractured bone was discussed with the patient today. With non-displaced or minimally displaced fractures, conservative treatment such as casting or splinting often results in a functional recovery. Typically, these fractures are immobilized in either a cast or splint depending on the pattern. Radiographs are typically taken at intervals throughout the fracture healing to ensure that reduction or alignment is not lost.  If the fracture loses its alignment, surgical intervention may be required to stabilize it. Medical conditions such as diabetes, osteoporosis, vitamin D deficiency, and a history of or exposure to smoking may delay or prevent fracture healing. Options between cast/splint immobilization and surgical treatment were offered and the risks and benefits of both were discussed. ____________________________________________________________________________________________________________________________________________      CHIEF COMPLAINT:  Chief Complaint   Patient presents with   • Left Ring Finger - Fracture     Patient was  walking her dog an broke her finger        SUBJECTIVE:  Sosa García is a 46y.o. year old RHD female who presents to the office for evaluation of her left ring finger. I am seeing Rolf Gonzalez in consultation at the [facility]. She states approx.  1 month ago, the week of 11/6/23, her dog chased a squirrel while on a leash, injuring her left ring finger. He presented to her PCP last week, at which time x-rays were performed. She notes minimal pain to the finger. She is able to use the finger for ADL without significant pain. She does note some stiffness. She is not currently taking anything for pain control. She did take Advil on an as needed basis. Pain/symptom timing:  Worse during the day when active  Pain/symptom context:  Worse with activites and work  Pain/symptom modifying factors:  Rest makes better, activities make worse  Pain/symptom associated signs/symptoms: none    Prior treatment   NSAIDsYes   Injections No   Bracing/Orthotics No    Physical Therapy No     I have personally reviewed all the relevant PMH, PSH, SH, FH, Medications and allergies      PAST MEDICAL HISTORY:  Past Medical History:   Diagnosis Date   • Chicken pox 03/04/2015    Last assessed   • Depression    • Heartburn     occasional - over the last year   • History of vertigo 2017   • Ocular migraine     last on 2 weeks ago   • TMJ disease     mild       PAST SURGICAL HISTORY:  Past Surgical History:   Procedure Laterality Date   • PILONIDAL CYST EXCISION     • WISDOM TOOTH EXTRACTION         FAMILY HISTORY:  Family History   Problem Relation Age of Onset   • No Known Problems Mother    • Heart disease Father    • Diabetes Father    • Hyperlipidemia Father    • Parkinsonism Father        SOCIAL HISTORY:  Social History     Tobacco Use   • Smoking status: Former   • Smokeless tobacco: Never   • Tobacco comments:     quit cigarettes in her 19's   Vaping Use   • Vaping Use: Never used   Substance Use Topics   • Alcohol use:  Yes     Alcohol/week: 1.0 standard drink of alcohol     Types: 1 Shots of liquor per week     Comment: Social   • Drug use: No       MEDICATIONS:    Current Outpatient Medications:   •  Cetirizine HCl 10 MG CAPS, Take by mouth, Disp: , Rfl:   •  Diclofenac Sodium 1.5 % SOLN, APPLY 5 TO 10 DROPS TO THE AFFECTED AREA OF THE FOOT/ANKLE 3 TIMES A DAY, Disp: , Rfl:   •  fluocinolone (SYNALAR) 0.025 % ointment, , Disp: , Rfl:   •  ketoconazole (NIZORAL) 2 % shampoo, , Disp: , Rfl:   •  Multiple Vitamin (DAILY VALUE MULTIVITAMIN PO), Take 1 tablet by mouth daily, Disp: , Rfl:   •  escitalopram (LEXAPRO) 10 mg tablet, Take 1 tablet (10 mg total) by mouth daily (Patient not taking: Reported on 8/8/2023), Disp: 30 tablet, Rfl: 11  •  hydrocortisone 2.5 % cream, Apply topically 2 (two) times a day as needed for irritation or rash (with itchy rashes) for up to 14 days, Disp: 30 g, Rfl: 1  •  predniSONE 20 mg tablet, Take 1 tablet (20 mg total) by mouth 2 (two) times a day with meals (Patient not taking: Reported on 11/30/2023), Disp: 8 tablet, Rfl: 0  •  Probiotic Product (PROBIOTIC ADVANCED PO), Take by mouth (Patient not taking: Reported on 8/8/2023), Disp: , Rfl:     ALLERGIES:  Allergies   Allergen Reactions   • Gluten Meal - Food Allergy    • Wheat Bran - Food Allergy    • Bupropion Anxiety           REVIEW OF SYSTEMS:  Review of Systems   Constitutional:  Negative for chills, fever and unexpected weight change. HENT:  Negative for hearing loss, nosebleeds and sore throat. Eyes:  Negative for pain, redness and visual disturbance. Respiratory:  Negative for cough, shortness of breath and wheezing. Cardiovascular:  Negative for chest pain, palpitations and leg swelling. Gastrointestinal:  Negative for abdominal pain, nausea and vomiting. Endocrine: Negative for polydipsia and polyuria. Genitourinary:  Negative for difficulty urinating and hematuria. Musculoskeletal:  Negative for arthralgias, joint swelling and myalgias. Skin:  Negative for rash and wound. Neurological:  Negative for dizziness, numbness and headaches. Psychiatric/Behavioral:  Negative for decreased concentration, dysphoric mood and suicidal ideas. The patient is not nervous/anxious.         VITALS:  Vitals:    12/06/23 4020 BP: 112/74   Pulse: 78       LABS:  HgA1c:   Lab Results   Component Value Date    HGBA1C 5.2 05/20/2021     BMP:   Lab Results   Component Value Date    CALCIUM 8.7 06/27/2020    K 4.3 06/27/2020    CO2 23 06/27/2020     06/27/2020    BUN 14 06/27/2020    CREATININE 0.66 06/27/2020       _____________________________________________________  PHYSICAL EXAMINATION:  General: well developed and well nourished, alert, oriented times 3, and appears comfortable  Psychiatric: Normal  HEENT: Normocephalic, Atraumatic Trachea Midline, No torticollis  Pulmonary: No audible wheezing or respiratory distress   Abdomen/GI: Non tender, non distended   Cardiovascular: No pitting edema, 2+ radial pulse   Skin: No masses, erythema, lacerations, fluctation, ulcerations  Neurovascular: Sensation Intact to the Median, Ulnar, Radial Nerve, Motor Intact to the Median, Ulnar, Radial Nerve, and Pulses Intact  Musculoskeletal: Normal, except as noted in detailed exam and in HPI.       MUSCULOSKELETAL EXAMINATION:    Left ring finger:    No erythema or ecchymosis   Mild edema noted   DIP joint mallet deformity   Slight swan neck deformity occurring   Full DIP, PIP and MCP flexion   Full composite fist   Brisk capillary refill     ___________________________________________________  STUDIES REVIEWED:  I have personally reviewed AP lateral and oblique radiographs of left ring finger    which demonstrate intra articular base of the distal phalanx fx            PROCEDURES PERFORMED:  Procedures  No Procedures performed today    _____________________________________________________      Manuel Toney      I,:  Paty Gerard am acting as a scribe while in the presence of the attending physician.:       I,:  Yesenia Ornelas MD personally performed the services described in this documentation    as scribed in my presence.:

## 2023-12-06 NOTE — PROGRESS NOTES
Yesenia Ornelas M.D. Attending, Orthopaedic Surgery  Hand, Wrist, and Elbow Surgery  [facility]      ORTHOPAEDIC HAND, WRIST, AND ELBOW OFFICE  VISIT       ASSESSMENT/PLAN:      46 y.o. female with left ring finger distal phalanx fracture, mallet deformity, DOI approx. 1 month ago week of 11/6/23     X-rays were reviewed. It was discussed with Murtaza Echols that she injured her terminal extensor tendon which is why she has a slight droop to her DIP joint. She is also starting to develop a slight swan neck deformity. We discussed hyperextension splinting to try and correct the deformity. She would wear the splint 100 percent of the time for the next 6-8 weeks, even for hygiene purposes. After full time use she will be transitioned to nighttime use for another 3 weeks time. OT was ordered for 2 hyperextension cap splints. OT will show her how to properly exchange out the splints for hygiene purposes. I will see her back in 2-3 weeks time for a skin check. The patient verbalized understanding of exam findings and treatment plan. We engaged in the shared decision-making process and treatment options were discussed at length with the patient. Surgical and conservative management discussed today along with risks and benefits. Diagnoses and all orders for this visit:    Mallet deformity of left ring finger  -     Ambulatory Referral to PT/OT Hand Therapy; Future    Closed nondisplaced fracture of distal phalanx of left ring finger, initial encounter  -     Ambulatory Referral to Hand Surgery  -     Ambulatory Referral to PT/OT Hand Therapy; Future    Other orders  -     ketoconazole (NIZORAL) 2 % shampoo  -     fluocinolone (SYNALAR) 0.025 % ointment  -     Diclofenac Sodium 1.5 % SOLN; APPLY 5 TO 10 DROPS TO THE AFFECTED AREA OF THE FOOT/ANKLE 3 TIMES A DAY        Follow Up:  Return for 2-3 weeks.     To Do Next Visit:  Re-evaluation of current issue      General Discussions:  Fracture - Nonoperative Care: The physiology of a fractured bone was discussed with the patient today. With non-displaced or minimally displaced fractures, conservative treatment such as casting or splinting often results in a functional recovery. Typically, these fractures are immobilized in either a cast or splint depending on the pattern. Radiographs are typically taken at intervals throughout the fracture healing to ensure that reduction or alignment is not lost.  If the fracture loses its alignment, surgical intervention may be required to stabilize it. Medical conditions such as diabetes, osteoporosis, vitamin D deficiency, and a history of or exposure to smoking may delay or prevent fracture healing. Options between cast/splint immobilization and surgical treatment were offered and the risks and benefits of both were discussed. ____________________________________________________________________________________________________________________________________________      CHIEF COMPLAINT:  Chief Complaint   Patient presents with    Left Ring Finger - Fracture     Patient was  walking her dog an broke her finger        SUBJECTIVE:  Sung Chávez is a 46y.o. year old RHD female who presents to the office for evaluation of her left ring finger. I am seeing Lucy Mccarthy in consultation at the [facility]. She states approx. 1 month ago, the week of 11/6/23, her dog chased a squirrel while on a leash, injuring her left ring finger. He presented to her PCP last week, at which time x-rays were performed. She notes minimal pain to the finger. She is able to use the finger for ADL without significant pain. She does note some stiffness. She is not currently taking anything for pain control. She did take Advil on an as needed basis.       Pain/symptom timing:  Worse during the day when active  Pain/symptom context:  Worse with activites and work  Pain/symptom modifying factors:  Rest makes better, activities make worse  Pain/symptom associated signs/symptoms: none    Prior treatment   NSAIDsYes   Injections No   Bracing/Orthotics No    Physical Therapy No     I have personally reviewed all the relevant PMH, PSH, SH, FH, Medications and allergies      PAST MEDICAL HISTORY:  Past Medical History:   Diagnosis Date    Chicken pox 03/04/2015    Last assessed    Depression     Heartburn     occasional - over the last year    History of vertigo 2017    Ocular migraine     last on 2 weeks ago    TMJ disease     mild       PAST SURGICAL HISTORY:  Past Surgical History:   Procedure Laterality Date    PILONIDAL CYST EXCISION      WISDOM TOOTH EXTRACTION         FAMILY HISTORY:  Family History   Problem Relation Age of Onset    No Known Problems Mother     Heart disease Father     Diabetes Father     Hyperlipidemia Father     Parkinsonism Father        SOCIAL HISTORY:  Social History     Tobacco Use    Smoking status: Former    Smokeless tobacco: Never    Tobacco comments:     quit cigarettes in her 19's   Vaping Use    Vaping Use: Never used   Substance Use Topics    Alcohol use:  Yes     Alcohol/week: 1.0 standard drink of alcohol     Types: 1 Shots of liquor per week     Comment: Social    Drug use: No       MEDICATIONS:    Current Outpatient Medications:     Cetirizine HCl 10 MG CAPS, Take by mouth, Disp: , Rfl:     Diclofenac Sodium 1.5 % SOLN, APPLY 5 TO 10 DROPS TO THE AFFECTED AREA OF THE FOOT/ANKLE 3 TIMES A DAY, Disp: , Rfl:     fluocinolone (SYNALAR) 0.025 % ointment, , Disp: , Rfl:     ketoconazole (NIZORAL) 2 % shampoo, , Disp: , Rfl:     Multiple Vitamin (DAILY VALUE MULTIVITAMIN PO), Take 1 tablet by mouth daily, Disp: , Rfl:     escitalopram (LEXAPRO) 10 mg tablet, Take 1 tablet (10 mg total) by mouth daily (Patient not taking: Reported on 8/8/2023), Disp: 30 tablet, Rfl: 11    hydrocortisone 2.5 % cream, Apply topically 2 (two) times a day as needed for irritation or rash (with itchy rashes) for up to 14 days, Disp: 30 g, Rfl: 1    predniSONE 20 mg tablet, Take 1 tablet (20 mg total) by mouth 2 (two) times a day with meals (Patient not taking: Reported on 11/30/2023), Disp: 8 tablet, Rfl: 0    Probiotic Product (PROBIOTIC ADVANCED PO), Take by mouth (Patient not taking: Reported on 8/8/2023), Disp: , Rfl:     ALLERGIES:  Allergies   Allergen Reactions    Gluten Meal - Food Allergy     Wheat Bran - Food Allergy     Bupropion Anxiety           REVIEW OF SYSTEMS:  Review of Systems   Constitutional:  Negative for chills, fever and unexpected weight change. HENT:  Negative for hearing loss, nosebleeds and sore throat. Eyes:  Negative for pain, redness and visual disturbance. Respiratory:  Negative for cough, shortness of breath and wheezing. Cardiovascular:  Negative for chest pain, palpitations and leg swelling. Gastrointestinal:  Negative for abdominal pain, nausea and vomiting. Endocrine: Negative for polydipsia and polyuria. Genitourinary:  Negative for difficulty urinating and hematuria. Musculoskeletal:  Negative for arthralgias, joint swelling and myalgias. Skin:  Negative for rash and wound. Neurological:  Negative for dizziness, numbness and headaches. Psychiatric/Behavioral:  Negative for decreased concentration, dysphoric mood and suicidal ideas. The patient is not nervous/anxious.         VITALS:  Vitals:    12/06/23 0823   BP: 112/74   Pulse: 78       LABS:  HgA1c:   Lab Results   Component Value Date    HGBA1C 5.2 05/20/2021     BMP:   Lab Results   Component Value Date    CALCIUM 8.7 06/27/2020    K 4.3 06/27/2020    CO2 23 06/27/2020     06/27/2020    BUN 14 06/27/2020    CREATININE 0.66 06/27/2020       _____________________________________________________  PHYSICAL EXAMINATION:  General: well developed and well nourished, alert, oriented times 3, and appears comfortable  Psychiatric: Normal  HEENT: Normocephalic, Atraumatic Trachea Midline, No torticollis  Pulmonary: No audible wheezing or respiratory distress   Abdomen/GI: Non tender, non distended   Cardiovascular: No pitting edema, 2+ radial pulse   Skin: No masses, erythema, lacerations, fluctation, ulcerations  Neurovascular: Sensation Intact to the Median, Ulnar, Radial Nerve, Motor Intact to the Median, Ulnar, Radial Nerve, and Pulses Intact  Musculoskeletal: Normal, except as noted in detailed exam and in HPI.       MUSCULOSKELETAL EXAMINATION:    Left ring finger:    No erythema or ecchymosis   Mild edema noted   DIP joint mallet deformity   Slight swan neck deformity occurring   Full DIP, PIP and MCP flexion   Full composite fist   Brisk capillary refill     ___________________________________________________  STUDIES REVIEWED:  I have personally reviewed AP lateral and oblique radiographs of left ring finger    which demonstrate intra articular base of the distal phalanx fx            PROCEDURES PERFORMED:  Procedures  No Procedures performed today    _____________________________________________________      Darlin Simon      I,:  Fidencio Gerard am acting as a scribe while in the presence of the attending physician.:       I,:  Javier Harris MD personally performed the services described in this documentation    as scribed in my presence.:

## 2023-12-15 ENCOUNTER — VBI (OUTPATIENT)
Dept: ADMINISTRATIVE | Facility: OTHER | Age: 48
End: 2023-12-15

## 2023-12-21 ENCOUNTER — OFFICE VISIT (OUTPATIENT)
Dept: OBGYN CLINIC | Facility: CLINIC | Age: 48
End: 2023-12-21
Payer: COMMERCIAL

## 2023-12-21 VITALS — HEIGHT: 63 IN | WEIGHT: 203 LBS | BODY MASS INDEX: 35.97 KG/M2

## 2023-12-21 DIAGNOSIS — S62.665D CLOSED NONDISPLACED FRACTURE OF DISTAL PHALANX OF LEFT RING FINGER WITH ROUTINE HEALING, SUBSEQUENT ENCOUNTER: Primary | ICD-10-CM

## 2023-12-21 DIAGNOSIS — M20.012 MALLET DEFORMITY OF LEFT RING FINGER: ICD-10-CM

## 2023-12-21 PROCEDURE — 99213 OFFICE O/P EST LOW 20 MIN: CPT | Performed by: SURGERY

## 2023-12-21 NOTE — PROGRESS NOTES
Jonny Hernandez M.D.  Attending, Orthopaedic Surgery  Hand, Wrist, and Elbow Surgery  [facility]      ORTHOPAEDIC HAND, WRIST, AND ELBOW OFFICE  VISIT       ASSESSMENT/PLAN:      48 y.o. female with left ring finger distal phalanx fracture, mallet deformity, DOI approx. 1 month ago week of 11/6/23     Patient has been tolerating her hyperextension splints well and has been compliant with 24/7 use. Will continue splinting for another 4 weeks and then re-evaluate in the office. As long as she is doing well and the finger remains in extension can transition to night time use only for an additional 4 weeks      The patient verbalized understanding of exam findings and treatment plan. We engaged in the shared decision-making process and treatment options were discussed at length with the patient. Surgical and conservative management discussed today along with risks and benefits.    Diagnoses and all orders for this visit:    Closed nondisplaced fracture of distal phalanx of left ring finger with routine healing, subsequent encounter    Mallet deformity of left ring finger          Follow Up:  Return in about 4 weeks (around 1/18/2024) for Recheck.    To Do Next Visit:  Re-evaluation of current issue      ____________________________________________________________________________________________________________________________________________      CHIEF COMPLAINT:  Chief Complaint   Patient presents with    Left Ring Finger - Fracture     Patient was  walking her dog an broke her finger        SUBJECTIVE:  Polly OTERO Smithbeny is a 48 y.o. year old RHD female who presents to the office for follow up evaluation of her left ring finger mallet finger. She has been treating in extension splints for the last 2 weeks and is tolerating it well. She notes some occasional pain in the side of the finger likely from the splint but otherwise is tolerating it well. No skin breakdown     I have personally reviewed  all the relevant PMH, PSH, SH, FH, Medications and allergies      PAST MEDICAL HISTORY:  Past Medical History:   Diagnosis Date    Chicken pox 03/04/2015    Last assessed    Depression     Heartburn     occasional - over the last year    History of vertigo 2017    Ocular migraine     last on 2 weeks ago    TMJ disease     mild       PAST SURGICAL HISTORY:  Past Surgical History:   Procedure Laterality Date    PILONIDAL CYST EXCISION      WISDOM TOOTH EXTRACTION         FAMILY HISTORY:  Family History   Problem Relation Age of Onset    No Known Problems Mother     Heart disease Father     Diabetes Father     Hyperlipidemia Father     Parkinsonism Father        SOCIAL HISTORY:  Social History     Tobacco Use    Smoking status: Former    Smokeless tobacco: Never    Tobacco comments:     quit cigarettes in her 20's   Vaping Use    Vaping status: Never Used   Substance Use Topics    Alcohol use: Yes     Alcohol/week: 1.0 standard drink of alcohol     Types: 1 Shots of liquor per week     Comment: Social    Drug use: No       MEDICATIONS:    Current Outpatient Medications:     Cetirizine HCl 10 MG CAPS, Take by mouth, Disp: , Rfl:     Diclofenac Sodium 1.5 % SOLN, APPLY 5 TO 10 DROPS TO THE AFFECTED AREA OF THE FOOT/ANKLE 3 TIMES A DAY, Disp: , Rfl:     escitalopram (LEXAPRO) 10 mg tablet, Take 1 tablet (10 mg total) by mouth daily (Patient not taking: Reported on 8/8/2023), Disp: 30 tablet, Rfl: 11    fluocinolone (SYNALAR) 0.025 % ointment, , Disp: , Rfl:     hydrocortisone 2.5 % cream, Apply topically 2 (two) times a day as needed for irritation or rash (with itchy rashes) for up to 14 days, Disp: 30 g, Rfl: 1    ketoconazole (NIZORAL) 2 % shampoo, , Disp: , Rfl:     Multiple Vitamin (DAILY VALUE MULTIVITAMIN PO), Take 1 tablet by mouth daily, Disp: , Rfl:     predniSONE 20 mg tablet, Take 1 tablet (20 mg total) by mouth 2 (two) times a day with meals (Patient not taking: Reported on 11/30/2023), Disp: 8 tablet, Rfl:  0    Probiotic Product (PROBIOTIC ADVANCED PO), Take by mouth (Patient not taking: Reported on 8/8/2023), Disp: , Rfl:     ALLERGIES:  Allergies   Allergen Reactions    Gluten Meal - Food Allergy     Wheat Bran - Food Allergy     Bupropion Anxiety           REVIEW OF SYSTEMS:  Review of Systems   Constitutional:  Negative for chills, fever and unexpected weight change.   HENT:  Negative for hearing loss, nosebleeds and sore throat.    Eyes:  Negative for pain, redness and visual disturbance.   Respiratory:  Negative for cough, shortness of breath and wheezing.    Cardiovascular:  Negative for chest pain, palpitations and leg swelling.   Gastrointestinal:  Negative for abdominal pain, nausea and vomiting.   Endocrine: Negative for polydipsia and polyuria.   Genitourinary:  Negative for difficulty urinating and hematuria.   Musculoskeletal:  Negative for arthralgias, joint swelling and myalgias.   Skin:  Negative for rash and wound.   Neurological:  Negative for dizziness, numbness and headaches.   Psychiatric/Behavioral:  Negative for decreased concentration, dysphoric mood and suicidal ideas. The patient is not nervous/anxious.        VITALS:  There were no vitals filed for this visit.      LABS:  HgA1c:   Lab Results   Component Value Date    HGBA1C 5.2 05/20/2021     BMP:   Lab Results   Component Value Date    CALCIUM 8.7 06/27/2020    K 4.3 06/27/2020    CO2 23 06/27/2020     06/27/2020    BUN 14 06/27/2020    CREATININE 0.66 06/27/2020       _____________________________________________________  PHYSICAL EXAMINATION:  General: well developed and well nourished, alert, oriented times 3, and appears comfortable  Psychiatric: Normal  HEENT: Normocephalic, Atraumatic Trachea Midline, No torticollis  Pulmonary: No audible wheezing or respiratory distress   Abdomen/GI: Non tender, non distended   Cardiovascular: No pitting edema, 2+ radial pulse   Skin: No masses, erythema, lacerations, fluctation,  ulcerations  Neurovascular: Sensation Intact to the Median, Ulnar, Radial Nerve, Motor Intact to the Median, Ulnar, Radial Nerve, and Pulses Intact  Musculoskeletal: Normal, except as noted in detailed exam and in HPI.      MUSCULOSKELETAL EXAMINATION:    Left ring finger:    No erythema or ecchymosis   No edema noted   Skin intact, no maceration   ROM deferred  Full composite fist   Brisk capillary refill     ___________________________________________________  STUDIES REVIEWED:  No studies to review         PROCEDURES PERFORMED:  Procedures  No Procedures performed today    _____________________________________________________    Stacey Lr

## 2024-01-16 ENCOUNTER — OFFICE VISIT (OUTPATIENT)
Dept: FAMILY MEDICINE CLINIC | Facility: CLINIC | Age: 49
End: 2024-01-16
Payer: COMMERCIAL

## 2024-01-16 VITALS
BODY MASS INDEX: 35.96 KG/M2 | OXYGEN SATURATION: 97 % | DIASTOLIC BLOOD PRESSURE: 72 MMHG | HEIGHT: 63 IN | HEART RATE: 89 BPM | SYSTOLIC BLOOD PRESSURE: 110 MMHG

## 2024-01-16 DIAGNOSIS — F33.8 SEASONAL AFFECTIVE DISORDER (HCC): ICD-10-CM

## 2024-01-16 DIAGNOSIS — E78.2 MIXED HYPERLIPIDEMIA: Primary | ICD-10-CM

## 2024-01-16 DIAGNOSIS — F33.0 MILD EPISODE OF RECURRENT MAJOR DEPRESSIVE DISORDER (HCC): ICD-10-CM

## 2024-01-16 DIAGNOSIS — E66.9 CLASS 2 OBESITY: ICD-10-CM

## 2024-01-16 PROCEDURE — 99214 OFFICE O/P EST MOD 30 MIN: CPT | Performed by: PHYSICIAN ASSISTANT

## 2024-01-16 RX ORDER — ESCITALOPRAM OXALATE 10 MG/1
10 TABLET ORAL DAILY
Qty: 30 TABLET | Refills: 11 | Status: SHIPPED | OUTPATIENT
Start: 2024-01-16

## 2024-01-16 NOTE — ASSESSMENT & PLAN NOTE
Uncontrolled off lexapro for the past 1-1.5 years. Due to situational and seasonal stressors pt would like to restart 10 mg daily. NO SI or HI. Recheck in 6 weeks with labs.

## 2024-01-16 NOTE — PROGRESS NOTES
Name: Polly Schwab      : 1975      MRN: [id number]  Encounter Provider: Jolie Martinez PA-C  Encounter Date: 2024   Encounter department: [facility] PRIMARY CARE    Assessment & Plan     1. Mixed hyperlipidemia  Assessment & Plan:  Check fasting lipid panel.     Orders:  -     Lipid Panel with Direct LDL reflex    2. Seasonal affective disorder (HCC)  Assessment & Plan:  Restart lexapro and check in 6 weeks.     Orders:  -     escitalopram (LEXAPRO) 10 mg tablet; Take 1 tablet (10 mg total) by mouth daily    3. Mild episode of recurrent major depressive disorder (HCC)  Assessment & Plan:  Uncontrolled off lexapro for the past 1-1.5 years. Due to situational and seasonal stressors pt would like to restart 10 mg daily. NO SI or HI. Recheck in 6 weeks with labs.     Orders:  -     escitalopram (LEXAPRO) 10 mg tablet; Take 1 tablet (10 mg total) by mouth daily  -     Comprehensive metabolic panel  -     CBC and differential  -     TSH, 3rd generation with Free T4 reflex    4. Class 2 obesity  Assessment & Plan:  Check fasting labs.     Orders:  -     Comprehensive metabolic panel  -     CBC and differential  -     TSH, 3rd generation with Free T4 reflex        Depression Screening and Follow-up Plan: Patient was screened for depression during today's encounter. They screened negative with a PHQ-9 score of 4.        Subjective      Patient presents with:  New Med Request: Pt will like to discuss getting back on lexapro.  Patient stopped taking the Lexapro weaned herself off in 2022 spring and she has been doing great ever since however she has had a very stressful last couple of months and her father-in-law just passed away who they were caring for in a nursing home and traveling back and forth to [place] to get her mother-in-law to visit him and then she has had multiple medical problems herself breaking her finger having multiple respiratory illnesses and having an infection in her foot  "that was missed treated with prednisone and then she needed 3 antibiotics to correct it.  Patient is feeling like there is a constant black cloud over her kind of impact SI or HI and knows that Lexapro works really well for her and she would like to restart it at this time.          Review of Systems   Constitutional: Negative.    HENT: Negative.     Eyes: Negative.    Respiratory: Negative.     Cardiovascular: Negative.    Gastrointestinal: Negative.    Endocrine: Negative.    Genitourinary: Negative.    Musculoskeletal: Negative.    Skin: Negative.    Allergic/Immunologic: Negative.    Neurological: Negative.    Hematological: Negative.    Psychiatric/Behavioral:  Positive for dysphoric mood.        Current Outpatient Medications on File Prior to Visit   Medication Sig   • Cetirizine HCl 10 MG CAPS Take by mouth   • fluocinolone (SYNALAR) 0.025 % ointment    • ketoconazole (NIZORAL) 2 % shampoo    • Multiple Vitamin (DAILY VALUE MULTIVITAMIN PO) Take 1 tablet by mouth daily   • Probiotic Product (PROBIOTIC ADVANCED PO) Take by mouth   • hydrocortisone 2.5 % cream Apply topically 2 (two) times a day as needed for irritation or rash (with itchy rashes) for up to 14 days   • [DISCONTINUED] Diclofenac Sodium 1.5 % SOLN APPLY 5 TO 10 DROPS TO THE AFFECTED AREA OF THE FOOT/ANKLE 3 TIMES A DAY   • [DISCONTINUED] escitalopram (LEXAPRO) 10 mg tablet Take 1 tablet (10 mg total) by mouth daily (Patient not taking: Reported on 8/8/2023)   • [DISCONTINUED] predniSONE 20 mg tablet Take 1 tablet (20 mg total) by mouth 2 (two) times a day with meals (Patient not taking: Reported on 11/30/2023)       Objective     /72 (BP Location: Left arm, Patient Position: Sitting, Cuff Size: Adult)   Pulse 89   Ht 5' 3\" (1.6 m)   SpO2 97%   BMI 35.96 kg/m²     Physical Exam  Vitals and nursing note reviewed.   Constitutional:       General: She is not in acute distress.     Appearance: She is well-developed. She is not diaphoretic. "   HENT:      Head: Normocephalic and atraumatic.      Nose: Nose normal.   Eyes:      General:         Right eye: No discharge.         Left eye: No discharge.      Conjunctiva/sclera: Conjunctivae normal.   Neck:      Vascular: No carotid bruit.   Cardiovascular:      Rate and Rhythm: Normal rate and regular rhythm.      Heart sounds: Normal heart sounds. No murmur heard.     No friction rub. No gallop.   Pulmonary:      Effort: Pulmonary effort is normal. No respiratory distress.      Breath sounds: Normal breath sounds. No wheezing or rales.   Musculoskeletal:      Cervical back: Neck supple.   Skin:     General: Skin is warm and dry.   Neurological:      Mental Status: She is alert and oriented to person, place, and time.   Psychiatric:         Attention and Perception: Attention normal.         Mood and Affect: Affect is tearful.         Speech: Speech normal.         Behavior: Behavior normal.         Thought Content: Thought content normal.         Cognition and Memory: Cognition normal.         Judgment: Judgment normal.       Jolie Martinez PA-C

## 2024-01-16 NOTE — PATIENT INSTRUCTIONS
Problem List Items Addressed This Visit          Other    Mild episode of recurrent major depressive disorder (HCC)    Relevant Medications    escitalopram (LEXAPRO) 10 mg tablet    Other Relevant Orders    Comprehensive metabolic panel    CBC and differential    TSH, 3rd generation with Free T4 reflex    Seasonal affective disorder (HCC)    Relevant Medications    escitalopram (LEXAPRO) 10 mg tablet    Class 2 obesity    Relevant Orders    Comprehensive metabolic panel    CBC and differential    TSH, 3rd generation with Free T4 reflex    Mixed hyperlipidemia - Primary    Relevant Orders    Lipid Panel with Direct LDL reflex

## 2024-01-17 ENCOUNTER — OFFICE VISIT (OUTPATIENT)
Dept: OBGYN CLINIC | Facility: CLINIC | Age: 49
End: 2024-01-17
Payer: COMMERCIAL

## 2024-01-17 VITALS
WEIGHT: 203 LBS | SYSTOLIC BLOOD PRESSURE: 110 MMHG | BODY MASS INDEX: 35.97 KG/M2 | HEIGHT: 63 IN | HEART RATE: 87 BPM | DIASTOLIC BLOOD PRESSURE: 75 MMHG

## 2024-01-17 DIAGNOSIS — M20.012 MALLET DEFORMITY OF LEFT RING FINGER: ICD-10-CM

## 2024-01-17 DIAGNOSIS — S62.665D CLOSED NONDISPLACED FRACTURE OF DISTAL PHALANX OF LEFT RING FINGER WITH ROUTINE HEALING, SUBSEQUENT ENCOUNTER: Primary | ICD-10-CM

## 2024-01-17 PROCEDURE — 99213 OFFICE O/P EST LOW 20 MIN: CPT | Performed by: SURGERY

## 2024-01-17 NOTE — PROGRESS NOTES
ASSESSMENT/PLAN:      48 y.o. female with left ring finger distal phalanx fracture, mallet deformity, DOI approx. 1 month ago week of 11/6/23     Polly may d/c the use of the hyperextension splint full time. Sh will wear this at night for an additional 4 weeks time. She may work on gentle ROM, advised not to be forceful. I will see her back in the office as needed if symptoms worsen or fail to improve.     The patient verbalized understanding of exam findings and treatment plan. We engaged in the shared decision-making process and treatment options were discussed at length with the patient. Surgical and conservative management discussed today along with risks and benefits.    Diagnoses and all orders for this visit:    Closed nondisplaced fracture of distal phalanx of left ring finger with routine healing, subsequent encounter    Mallet deformity of left ring finger      Follow Up:  Return if symptoms worsen or fail to improve.      To Do Next Visit:  Re-evaluation of current issue    ____________________________________________________________________________________________________________________________________________      CHIEF COMPLAINT:  Chief Complaint   Patient presents with    Follow-up     Patient is doing well no longer having any pain at this time.        SUBJECTIVE:  Polly Schwab is a 48 y.o. year old RHD female who presents to the office for a follow up regarding a left ring finger mallet fracture. Overall Polly is doing well. She has been compliant with full time hyperextension splinting. She denies any pain.         I have personally reviewed all the relevant PMH, PSH, SH, FH, Medications and allergies.     PAST MEDICAL HISTORY:  Past Medical History:   Diagnosis Date    Chicken pox 03/04/2015    Last assessed    Depression     Heartburn     occasional - over the last year    History of vertigo 2017    Ocular migraine     last on 2 weeks ago    TMJ disease     mild       PAST SURGICAL  HISTORY:  Past Surgical History:   Procedure Laterality Date    PILONIDAL CYST EXCISION      WISDOM TOOTH EXTRACTION         FAMILY HISTORY:  Family History   Problem Relation Age of Onset    No Known Problems Mother     Heart disease Father     Diabetes Father     Hyperlipidemia Father     Parkinsonism Father        SOCIAL HISTORY:  Social History     Tobacco Use    Smoking status: Former    Smokeless tobacco: Never    Tobacco comments:     quit cigarettes in her 20's   Vaping Use    Vaping status: Never Used   Substance Use Topics    Alcohol use: Yes     Alcohol/week: 1.0 standard drink of alcohol     Types: 1 Shots of liquor per week     Comment: Social    Drug use: No       MEDICATIONS:    Current Outpatient Medications:     Cetirizine HCl 10 MG CAPS, Take by mouth, Disp: , Rfl:     escitalopram (LEXAPRO) 10 mg tablet, Take 1 tablet (10 mg total) by mouth daily, Disp: 30 tablet, Rfl: 11    fluocinolone (SYNALAR) 0.025 % ointment, , Disp: , Rfl:     ketoconazole (NIZORAL) 2 % shampoo, , Disp: , Rfl:     Multiple Vitamin (DAILY VALUE MULTIVITAMIN PO), Take 1 tablet by mouth daily, Disp: , Rfl:     Probiotic Product (PROBIOTIC ADVANCED PO), Take by mouth, Disp: , Rfl:     hydrocortisone 2.5 % cream, Apply topically 2 (two) times a day as needed for irritation or rash (with itchy rashes) for up to 14 days, Disp: 30 g, Rfl: 1    ALLERGIES:  Allergies   Allergen Reactions    Gluten Meal - Food Allergy     Wheat Bran - Food Allergy     Bupropion Anxiety       REVIEW OF SYSTEMS:  Review of Systems   Constitutional:  Negative for chills, fever and unexpected weight change.   HENT:  Negative for hearing loss, nosebleeds and sore throat.    Eyes:  Negative for pain, redness and visual disturbance.   Respiratory:  Negative for cough, shortness of breath and wheezing.    Cardiovascular:  Negative for chest pain, palpitations and leg swelling.   Gastrointestinal:  Negative for abdominal pain, nausea and vomiting.   Endocrine:  Negative for polydipsia and polyuria.   Genitourinary:  Negative for difficulty urinating and hematuria.   Musculoskeletal:  Negative for arthralgias, joint swelling and myalgias.   Skin:  Negative for rash and wound.   Neurological:  Negative for dizziness, numbness and headaches.   Psychiatric/Behavioral:  Negative for decreased concentration, dysphoric mood and suicidal ideas. The patient is not nervous/anxious.        VITALS:  Vitals:    01/17/24 0804   BP: 110/75   Pulse: 87       LABS:  HgA1c:   Lab Results   Component Value Date    HGBA1C 5.2 05/20/2021     BMP:   Lab Results   Component Value Date    CALCIUM 8.7 06/27/2020    K 4.3 06/27/2020    CO2 23 06/27/2020     06/27/2020    BUN 14 06/27/2020    CREATININE 0.66 06/27/2020       _____________________________________________________  PHYSICAL EXAMINATION:  General: well developed and well nourished, alert, oriented times 3, and appears comfortable  Psychiatric: Normal  HEENT: Normocephalic, Atraumatic Trachea Midline, No torticollis  Pulmonary: No audible wheezing or respiratory distress   Cardiovascular: No pitting edema, 2+ radial pulse   Abdominal/GI: abdomen non tender, non distended   Skin: No masses, erythema, lacerations, fluctation, ulcerations  Neurovascular: Sensation Intact to the Median, Ulnar, Radial Nerve, Motor Intact to the Median, Ulnar, Radial Nerve, and Pulses Intact  Musculoskeletal: Normal, except as noted in detailed exam and in HPI.      MUSCULOSKELETAL EXAMINATION:    Left ring finger:     No erythema, ecchymosis or edema  Slight redness from immobilization and splinting   Full DIP joint extension, no residual DIP joint droop   Brisk capillary refill     ___________________________________________________  STUDIES REVIEWED:  No new imaging to review           PROCEDURES PERFORMED:  Procedures  No Procedures performed today    _____________________________________________________      Catalina Pierce I,:  Nicki  Rajani am acting as a scribe while in the presence of the attending physician.:       I,:  Jonny Hernandez MD personally performed the services described in this documentation    as scribed in my presence.:

## 2024-02-21 ENCOUNTER — TELEPHONE (OUTPATIENT)
Age: 49
End: 2024-02-21

## 2024-02-21 NOTE — TELEPHONE ENCOUNTER
Caller: Patient    Doctor: Mary    Reason for call: Patient saw Dr Hernandez 1/17, and was advised she could discontinue using splint at night after 4 weeks.  Patient discontinued nighttime splinting as planned, but has noticed the affected finger is sitting somewhat oriented outward toward the little finger.    Patient would like to know if this would warrant a new visit with Dr Hernandez, or if the clinical team would recommend exercises or PT.    Please call patient back to advise.    Call back#: [phone]

## 2024-02-21 NOTE — TELEPHONE ENCOUNTER
Can you please clarify if patient experiencing flexion deformity (droop - like she would have had after initial injury) or does this look different.

## 2024-02-21 NOTE — TELEPHONE ENCOUNTER
CLM on pt's identified VM w/Dr Hernandze's msg and requests CB to get information for Dr Hernandez.  Await CB.

## 2024-02-21 NOTE — TELEPHONE ENCOUNTER
Caller: Patient    Doctor: Mary    Reason for call: Returning call to nurse.  I informed patient that the nurse was gone for the day and would return her call in the AM.  Patient satisfied.     Call back#: [phone]

## 2024-02-22 NOTE — TELEPHONE ENCOUNTER
CLM on VM for pt to return call to relay Dr Hernandez's msg.  Apologized for playing phone tag.  Await CB.

## 2024-02-26 NOTE — TELEPHONE ENCOUNTER
Caller: patient    Doctor: hebert    Reason for call: patient returned call,transferred to nurse    Call back#: n/a

## 2024-02-26 NOTE — TELEPHONE ENCOUNTER
Received Transfer call to pt and she states she his not having flexor deforimity, droop.  States feels the ring finger top metacarpal is moving toward little finger.  One side feels tighter than the other side.  Some nights she did not  wear her splint at night.  States she feels like a bunion on pinky finger is forming.  Harder to bend fingers in morning and hurts a little w/movement.  Otherwise, it is ok.  No fever, no drainage.  Will send picture via [employer].

## 2024-02-26 NOTE — TELEPHONE ENCOUNTER
It sounds like she may be developing post traumatic arthritic changes. I would advise to use heat at night for 20 mintues before bed to help with discomfort/stiffness in am. I will look for images in my chart once sent.

## 2024-02-26 NOTE — TELEPHONE ENCOUNTER
Called and spoke w/pt and relayed Dr Hernandez's msg.  States she just got a msg from Dr Hernandez that she saw images in [facility].

## 2024-02-27 ENCOUNTER — APPOINTMENT (OUTPATIENT)
Dept: LAB | Facility: CLINIC | Age: 49
End: 2024-02-27
Payer: COMMERCIAL

## 2024-02-27 ENCOUNTER — OFFICE VISIT (OUTPATIENT)
Dept: FAMILY MEDICINE CLINIC | Facility: CLINIC | Age: 49
End: 2024-02-27
Payer: COMMERCIAL

## 2024-02-27 VITALS
BODY MASS INDEX: 36.14 KG/M2 | OXYGEN SATURATION: 98 % | HEIGHT: 63 IN | SYSTOLIC BLOOD PRESSURE: 112 MMHG | DIASTOLIC BLOOD PRESSURE: 62 MMHG | WEIGHT: 204 LBS | HEART RATE: 76 BPM

## 2024-02-27 DIAGNOSIS — F33.0 MILD EPISODE OF RECURRENT MAJOR DEPRESSIVE DISORDER (HCC): Primary | ICD-10-CM

## 2024-02-27 DIAGNOSIS — E78.2 MIXED HYPERLIPIDEMIA: ICD-10-CM

## 2024-02-27 LAB
ALBUMIN SERPL BCP-MCNC: 4.2 G/DL (ref 3.5–5)
ALP SERPL-CCNC: 49 U/L (ref 34–104)
ALT SERPL W P-5'-P-CCNC: 18 U/L (ref 7–52)
ANION GAP SERPL CALCULATED.3IONS-SCNC: 8 MMOL/L
AST SERPL W P-5'-P-CCNC: 16 U/L (ref 13–39)
BASOPHILS # BLD AUTO: 0.05 THOUSANDS/ÂΜL (ref 0–0.1)
BASOPHILS NFR BLD AUTO: 1 % (ref 0–1)
BILIRUB SERPL-MCNC: 0.42 MG/DL (ref 0.2–1)
BUN SERPL-MCNC: 13 MG/DL (ref 5–25)
CALCIUM SERPL-MCNC: 9.3 MG/DL (ref 8.4–10.2)
CHLORIDE SERPL-SCNC: 106 MMOL/L (ref 96–108)
CHOLEST SERPL-MCNC: 218 MG/DL
CO2 SERPL-SCNC: 25 MMOL/L (ref 21–32)
CREAT SERPL-MCNC: 0.61 MG/DL (ref 0.6–1.3)
EOSINOPHIL # BLD AUTO: 0.25 THOUSAND/ÂΜL (ref 0–0.61)
EOSINOPHIL NFR BLD AUTO: 4 % (ref 0–6)
ERYTHROCYTE [DISTWIDTH] IN BLOOD BY AUTOMATED COUNT: 13.6 % (ref 11.6–15.1)
GFR SERPL CREATININE-BSD FRML MDRD: 106 ML/MIN/1.73SQ M
GLUCOSE P FAST SERPL-MCNC: 97 MG/DL (ref 65–99)
HCT VFR BLD AUTO: 42 % (ref 34.8–46.1)
HDLC SERPL-MCNC: 56 MG/DL
HGB BLD-MCNC: 13.4 G/DL (ref 11.5–15.4)
IMM GRANULOCYTES # BLD AUTO: 0.01 THOUSAND/UL (ref 0–0.2)
IMM GRANULOCYTES NFR BLD AUTO: 0 % (ref 0–2)
LDLC SERPL CALC-MCNC: 136 MG/DL (ref 0–100)
LYMPHOCYTES # BLD AUTO: 2.09 THOUSANDS/ÂΜL (ref 0.6–4.47)
LYMPHOCYTES NFR BLD AUTO: 34 % (ref 14–44)
MCH RBC QN AUTO: 31 PG (ref 26.8–34.3)
MCHC RBC AUTO-ENTMCNC: 31.9 G/DL (ref 31.4–37.4)
MCV RBC AUTO: 97 FL (ref 82–98)
MONOCYTES # BLD AUTO: 0.49 THOUSAND/ÂΜL (ref 0.17–1.22)
MONOCYTES NFR BLD AUTO: 8 % (ref 4–12)
NEUTROPHILS # BLD AUTO: 3.2 THOUSANDS/ÂΜL (ref 1.85–7.62)
NEUTS SEG NFR BLD AUTO: 53 % (ref 43–75)
NRBC BLD AUTO-RTO: 0 /100 WBCS
PLATELET # BLD AUTO: 321 THOUSANDS/UL (ref 149–390)
PMV BLD AUTO: 9.2 FL (ref 8.9–12.7)
POTASSIUM SERPL-SCNC: 4.3 MMOL/L (ref 3.5–5.3)
PROT SERPL-MCNC: 6.4 G/DL (ref 6.4–8.4)
RBC # BLD AUTO: 4.32 MILLION/UL (ref 3.81–5.12)
SODIUM SERPL-SCNC: 139 MMOL/L (ref 135–147)
TRIGL SERPL-MCNC: 131 MG/DL
TSH SERPL DL<=0.05 MIU/L-ACNC: 4.24 UIU/ML (ref 0.45–4.5)
WBC # BLD AUTO: 6.09 THOUSAND/UL (ref 4.31–10.16)

## 2024-02-27 PROCEDURE — 99213 OFFICE O/P EST LOW 20 MIN: CPT | Performed by: PHYSICIAN ASSISTANT

## 2024-02-27 NOTE — PATIENT INSTRUCTIONS
1. Mild episode of recurrent major depressive disorder (HCC)  Assessment & Plan:  Pt. doing great with restart of lexapro at only 5 mg daily. Ok to stay on this or go up to 10 mg. Check in one year.       2. Mixed hyperlipidemia  Assessment & Plan:  LDL is slightly elevated at 136 but HDL triglycerides are within normal limits.  Total cholesterol therefore is high decrease fat and cholesterol recheck yearly.

## 2024-02-27 NOTE — PROGRESS NOTES
Name: Polly Schwab      : 1975      MRN: [id number]  Encounter Provider: Jolie Martinez PA-C  Encounter Date: 2024   Encounter department: [facility] PRIMARY CARE    Assessment & Plan     1. Mild episode of recurrent major depressive disorder (HCC)  Assessment & Plan:  Pt. doing great with restart of lexapro at only 5 mg daily. Ok to stay on this or go up to 10 mg. Check in one year.       2. Mixed hyperlipidemia  Assessment & Plan:  LDL is slightly elevated at 136 but HDL triglycerides are within normal limits.  Total cholesterol therefore is high decrease fat and cholesterol recheck yearly.           Subjective        Polly Schwab is here for chronic conditions f/u. Pt. had labs done prior to today's visit which included Recent Results (from the past 672 hour(s))  -Lipid Panel with Direct LDL reflex:   Collection Time: 24  7:11 AM       Result                      Value             Ref Range           Cholesterol                 218 (H)           See Comment *       Triglycerides               131               See Comment *       HDL, Direct                 56                >=50 mg/dL          LDL Calculated              136 (H)           0 - 100 mg/dL  -Comprehensive metabolic panel:   Collection Time: 24  7:11 AM       Result                      Value             Ref Range           Sodium                      139               135 - 147 mm*       Potassium                   4.3               3.5 - 5.3 mm*       Chloride                    106               96 - 108 mmo*       CO2                         25                21 - 32 mmol*       ANION GAP                   8                 mmol/L              BUN                         13                5 - 25 mg/dL        Creatinine                  0.61              0.60 - 1.30 *       Glucose, Fasting            97                65 - 99 mg/dL       Calcium                     9.3               8.4 - 10.2 m*        AST                         16                13 - 39 U/L         ALT                         18                7 - 52 U/L          Alkaline Phosphatase        49                34 - 104 U/L        Total Protein               6.4               6.4 - 8.4 g/*       Albumin                     4.2               3.5 - 5.0 g/*       Total Bilirubin             0.42              0.20 - 1.00 *       eGFR                        106               ml/min/1.73s*  -CBC and differential:   Collection Time: 02/27/24  7:11 AM       Result                      Value             Ref Range           WBC                         6.09              4.31 - 10.16*       RBC                         4.32              3.81 - 5.12 *       Hemoglobin                  13.4              11.5 - 15.4 *       Hematocrit                  42.0              34.8 - 46.1 %       MCV                         97                82 - 98 fL          MCH                         31.0              26.8 - 34.3 *       MCHC                        31.9              31.4 - 37.4 *       RDW                         13.6              11.6 - 15.1 %       MPV                         9.2               8.9 - 12.7 fL       Platelets                   321               149 - 390 Th*       nRBC                        0                 /100 WBCs           Neutrophils Relative        53                43 - 75 %           Immat GRANS %               0                 0 - 2 %             Lymphocytes Relative        34                14 - 44 %           Monocytes Relative          8                 4 - 12 %            Eosinophils Relative        4                 0 - 6 %             Basophils Relative          1                 0 - 1 %             Neutrophils Absolute        3.20              1.85 - 7.62 *       Immature Grans Absolute     0.01              0.00 - 0.20 *       Lymphocytes Absolute        2.09              0.60 - 4.47 *       Monocytes Absolute          0.49          "     0.17 - 1.22 *       Eosinophils Absolute        0.25              0.00 - 0.61 *       Basophils Absolute          0.05              0.00 - 0.10 *  -TSH, 3rd generation with Free T4 reflex:   Collection Time: 02/27/24  7:11 AM       Result                      Value             Ref Range           TSH 3RD GENERATON           4.241             0.450 - 4.50*        Review of Systems   Constitutional: Negative.    HENT: Negative.     Eyes: Negative.    Respiratory: Negative.     Cardiovascular: Negative.    Gastrointestinal: Negative.    Endocrine: Negative.    Genitourinary: Negative.    Musculoskeletal: Negative.    Skin: Negative.    Allergic/Immunologic: Negative.    Neurological: Negative.    Hematological: Negative.    Psychiatric/Behavioral: Negative.         Current Outpatient Medications on File Prior to Visit   Medication Sig   • Cetirizine HCl 10 MG CAPS Take by mouth   • escitalopram (LEXAPRO) 10 mg tablet Take 1 tablet (10 mg total) by mouth daily   • fluocinolone (SYNALAR) 0.025 % ointment    • ketoconazole (NIZORAL) 2 % shampoo    • Multiple Vitamin (DAILY VALUE MULTIVITAMIN PO) Take 1 tablet by mouth daily   • Probiotic Product (PROBIOTIC ADVANCED PO) Take by mouth   • hydrocortisone 2.5 % cream Apply topically 2 (two) times a day as needed for irritation or rash (with itchy rashes) for up to 14 days       Objective     /62 (BP Location: Right arm, Patient Position: Sitting, Cuff Size: Large)   Pulse 76   Ht 5' 3\" (1.6 m)   Wt 92.5 kg (204 lb)   SpO2 98%   BMI 36.14 kg/m²     Physical Exam  Vitals and nursing note reviewed.   Constitutional:       General: She is not in acute distress.     Appearance: She is well-developed. She is not diaphoretic.   HENT:      Head: Normocephalic and atraumatic.      Nose: Nose normal.   Eyes:      General:         Right eye: No discharge.         Left eye: No discharge.      Conjunctiva/sclera: Conjunctivae normal.   Neck:      Vascular: No carotid " bruit.   Cardiovascular:      Rate and Rhythm: Normal rate and regular rhythm.      Heart sounds: Normal heart sounds. No murmur heard.     No friction rub. No gallop.   Pulmonary:      Effort: Pulmonary effort is normal. No respiratory distress.      Breath sounds: Normal breath sounds. No wheezing or rales.   Musculoskeletal:      Cervical back: Neck supple.   Skin:     General: Skin is warm and dry.   Neurological:      Mental Status: She is alert and oriented to person, place, and time.   Psychiatric:         Judgment: Judgment normal.       Jolie Martinez PA-C

## 2024-02-27 NOTE — ASSESSMENT & PLAN NOTE
Pt. doing great with restart of lexapro at only 5 mg daily. Ok to stay on this or go up to 10 mg. Check in one year.

## 2024-02-27 NOTE — ASSESSMENT & PLAN NOTE
LDL is slightly elevated at 136 but HDL triglycerides are within normal limits.  Total cholesterol therefore is high decrease fat and cholesterol recheck yearly.

## 2024-05-15 ENCOUNTER — VBI (OUTPATIENT)
Dept: ADMINISTRATIVE | Facility: OTHER | Age: 49
End: 2024-05-15

## 2024-08-12 ENCOUNTER — VBI (OUTPATIENT)
Dept: ADMINISTRATIVE | Facility: OTHER | Age: 49
End: 2024-08-12

## 2024-08-12 NOTE — TELEPHONE ENCOUNTER
08/12/24 2:39 PM     Chart reviewed for Pap Smear (HPV) aka Cervical Cancer Screening ; nothing is submitted to the patient's insurance at this time.     Irma Alonso MA   [facility]

## 2024-11-18 ENCOUNTER — TELEPHONE (OUTPATIENT)
Age: 49
End: 2024-11-18

## 2024-11-18 DIAGNOSIS — R92.8 ABNORMAL MAMMOGRAM: Primary | ICD-10-CM

## 2024-11-18 NOTE — TELEPHONE ENCOUNTER
Orders have been faxed over  
Orders placed if we could please fax. Thank you.   
aCl from [facility] with [facility] called in regards to patient having an abnormal mammogram and results were routed to PCP in care everywhere. Cal stated that patient is schedule for addition imaging for this Thursday and would like provider to place orders for a left diagnositic mammo wiht CAD & 3d and left limited breast ultra with diagnoses code R92.8. Orders can be faxed to [phone] and Cal's call back number is [phone]      
English

## 2024-12-30 ENCOUNTER — VBI (OUTPATIENT)
Dept: ADMINISTRATIVE | Facility: OTHER | Age: 49
End: 2024-12-30

## 2024-12-30 NOTE — TELEPHONE ENCOUNTER
12/30/24 10:39 AM     Chart reviewed for   Cervical Cancer Screening    ; nothing is submitted to the patient's insurance at this time.     JEFFERSON BLACKBURN MA   [facility]

## 2025-01-06 ENCOUNTER — OFFICE VISIT (OUTPATIENT)
Dept: FAMILY MEDICINE CLINIC | Facility: CLINIC | Age: 50
End: 2025-01-06
Payer: COMMERCIAL

## 2025-01-06 VITALS
HEART RATE: 84 BPM | DIASTOLIC BLOOD PRESSURE: 68 MMHG | WEIGHT: 212.6 LBS | SYSTOLIC BLOOD PRESSURE: 120 MMHG | HEIGHT: 63 IN | OXYGEN SATURATION: 97 % | BODY MASS INDEX: 37.67 KG/M2

## 2025-01-06 DIAGNOSIS — E07.89 THYROID FULLNESS: ICD-10-CM

## 2025-01-06 DIAGNOSIS — F33.0 MILD EPISODE OF RECURRENT MAJOR DEPRESSIVE DISORDER (HCC): ICD-10-CM

## 2025-01-06 DIAGNOSIS — E66.812 CLASS 2 OBESITY: ICD-10-CM

## 2025-01-06 DIAGNOSIS — G43.B0 OPHTHALMOPLEGIC MIGRAINE, NOT INTRACTABLE: ICD-10-CM

## 2025-01-06 DIAGNOSIS — E78.2 MIXED HYPERLIPIDEMIA: ICD-10-CM

## 2025-01-06 DIAGNOSIS — R53.82 CHRONIC FATIGUE: Primary | ICD-10-CM

## 2025-01-06 PROCEDURE — 99214 OFFICE O/P EST MOD 30 MIN: CPT | Performed by: PHYSICIAN ASSISTANT

## 2025-01-06 NOTE — ASSESSMENT & PLAN NOTE
PT has been gaining weight and eating less food. Check TSH to make sure we rule out hypothyroidism.  Patient realizes that it may be perimenopausal although she is not experiencing any irregularity of her menstrual cycle and does have a GYN appointment upcoming.

## 2025-01-06 NOTE — ASSESSMENT & PLAN NOTE
Sudden onset. Will check labs. Mom is possibly on thyroid meds.   Orders:  •  CBC and differential  •  Comprehensive metabolic panel  •  TSH, 3rd generation with Free T4 reflex  •  Thyroid Antibodies Panel; Future

## 2025-01-06 NOTE — PROGRESS NOTES
Name: Polly Schwab      : 1975      MRN: [id number]  Encounter Provider: Jolie Martinez PA-C  Encounter Date: 2025   Encounter department: [facility]  :  Assessment & Plan  Chronic fatigue  Sudden onset. Will check labs. Mom is possibly on thyroid meds.   Orders:  •  CBC and differential  •  Comprehensive metabolic panel  •  TSH, 3rd generation with Free T4 reflex  •  Thyroid Antibodies Panel; Future    Mixed hyperlipidemia  Check fasting lipid panel.   Orders:  •  Lipid panel    Class 2 obesity  PT has been gaining weight and eating less food. Check TSH to make sure we rule out hypothyroidism.  Patient realizes that it may be perimenopausal although she is not experiencing any irregularity of her menstrual cycle and does have a GYN appointment upcoming.       Ophthalmoplegic migraine, not intractable  Ibuprofen works well when takes right away.        Mild episode of recurrent major depressive disorder (HCC)  PT is doing well and has started to cut back to only 5 mg. Would wait 2 months to stop.          Thyroid fullness  Right sided thyroid fullness versus increasing neck tissue.  Will check thyroid blood work including antibodies.  Orders:  •  Thyroid Antibodies Panel; Future           History of Present Illness     Patient presents with:  Follow-up: Feeling tired, gaining weight- check thyroid levels and all blood work. Ongoing for 2-3 months, but getting worse. Getting more migraines last couple weeks. Acid reflux- taking Pepcid complete          Review of Systems   Constitutional:  Positive for fatigue.   HENT: Negative.     Eyes: Negative.    Respiratory: Negative.     Cardiovascular: Negative.    Gastrointestinal: Negative.    Endocrine: Negative.    Genitourinary: Negative.    Musculoskeletal: Negative.    Skin: Negative.    Allergic/Immunologic: Negative.    Neurological: Negative.    Hematological: Negative.    Psychiatric/Behavioral: Negative.         Objective   BP  "120/68 (BP Location: Left arm, Patient Position: Sitting, Cuff Size: Standard)   Pulse 84   Ht 5' 3\" (1.6 m)   Wt 96.4 kg (212 lb 9.6 oz)   SpO2 97%   BMI 37.66 kg/m²      Physical Exam  Vitals and nursing note reviewed.   Constitutional:       Appearance: Normal appearance. She is well-developed.   HENT:      Head: Normocephalic and atraumatic.   Eyes:      General: Lids are normal.      Conjunctiva/sclera: Conjunctivae normal.      Pupils: Pupils are equal, round, and reactive to light.   Neck:      Comments: Possible right thyroid fullness.  Cardiovascular:      Rate and Rhythm: Normal rate and regular rhythm.      Heart sounds: No murmur heard.  Pulmonary:      Effort: Pulmonary effort is normal.      Breath sounds: Normal breath sounds.   Skin:     General: Skin is warm and dry.   Neurological:      General: No focal deficit present.      Mental Status: She is alert.      Coordination: Coordination is intact.   Psychiatric:         Mood and Affect: Mood normal.         Behavior: Behavior normal. Behavior is cooperative.         Thought Content: Thought content normal.         Judgment: Judgment normal.         "

## 2025-01-06 NOTE — PATIENT INSTRUCTIONS
1. Chronic fatigue  Assessment & Plan:  Sudden onset. Will check labs. Mom is possibly on thyroid meds.   Orders:    CBC and differential    Comprehensive metabolic panel    TSH, 3rd generation with Free T4 reflex    Thyroid Antibodies Panel; Future    Orders:  -     CBC and differential  -     Comprehensive metabolic panel  -     TSH, 3rd generation with Free T4 reflex  -     Thyroid Antibodies Panel; Future  2. Mixed hyperlipidemia  Assessment & Plan:  Check fasting lipid panel.   Orders:    Lipid panel    Orders:  -     Lipid panel  3. Class 2 obesity  Assessment & Plan:  PT has been gaining weight and eating less food. Check TSH to make sure we rule out hypothyroidism.        4. Ophthalmoplegic migraine, not intractable  Assessment & Plan:  Ibuprofen works well when takes right away.        5. Mild episode of recurrent major depressive disorder (HCC)  Assessment & Plan:  PT is doing well and has started to cut back to only 5 mg. Would wait 2 months to stop.          6. Thyroid fullness  -     Thyroid Antibodies Panel; Future

## 2025-01-07 ENCOUNTER — APPOINTMENT (OUTPATIENT)
Dept: LAB | Facility: CLINIC | Age: 50
End: 2025-01-07
Payer: COMMERCIAL

## 2025-01-07 DIAGNOSIS — E07.89 THYROID FULLNESS: ICD-10-CM

## 2025-01-07 DIAGNOSIS — R53.82 CHRONIC FATIGUE: ICD-10-CM

## 2025-01-07 LAB
ALBUMIN SERPL BCG-MCNC: 4.5 G/DL (ref 3.5–5)
ALP SERPL-CCNC: 54 U/L (ref 34–104)
ALT SERPL W P-5'-P-CCNC: 41 U/L (ref 7–52)
ANION GAP SERPL CALCULATED.3IONS-SCNC: 6 MMOL/L (ref 4–13)
AST SERPL W P-5'-P-CCNC: 26 U/L (ref 13–39)
BASOPHILS # BLD AUTO: 0.04 THOUSANDS/ΜL (ref 0–0.1)
BASOPHILS NFR BLD AUTO: 1 % (ref 0–1)
BILIRUB SERPL-MCNC: 0.41 MG/DL (ref 0.2–1)
BUN SERPL-MCNC: 12 MG/DL (ref 5–25)
CALCIUM SERPL-MCNC: 9.2 MG/DL (ref 8.4–10.2)
CHLORIDE SERPL-SCNC: 103 MMOL/L (ref 96–108)
CHOLEST SERPL-MCNC: 228 MG/DL (ref ?–200)
CO2 SERPL-SCNC: 27 MMOL/L (ref 21–32)
CREAT SERPL-MCNC: 0.61 MG/DL (ref 0.6–1.3)
EOSINOPHIL # BLD AUTO: 0.08 THOUSAND/ΜL (ref 0–0.61)
EOSINOPHIL NFR BLD AUTO: 2 % (ref 0–6)
ERYTHROCYTE [DISTWIDTH] IN BLOOD BY AUTOMATED COUNT: 13.6 % (ref 11.6–15.1)
GFR SERPL CREATININE-BSD FRML MDRD: 105 ML/MIN/1.73SQ M
GLUCOSE P FAST SERPL-MCNC: 100 MG/DL (ref 65–99)
HCT VFR BLD AUTO: 40.8 % (ref 34.8–46.1)
HDLC SERPL-MCNC: 63 MG/DL
HGB BLD-MCNC: 13.3 G/DL (ref 11.5–15.4)
IMM GRANULOCYTES # BLD AUTO: 0.01 THOUSAND/UL (ref 0–0.2)
IMM GRANULOCYTES NFR BLD AUTO: 0 % (ref 0–2)
LDLC SERPL CALC-MCNC: 145 MG/DL (ref 0–100)
LYMPHOCYTES # BLD AUTO: 1.43 THOUSANDS/ΜL (ref 0.6–4.47)
LYMPHOCYTES NFR BLD AUTO: 37 % (ref 14–44)
MCH RBC QN AUTO: 30.9 PG (ref 26.8–34.3)
MCHC RBC AUTO-ENTMCNC: 32.6 G/DL (ref 31.4–37.4)
MCV RBC AUTO: 95 FL (ref 82–98)
MONOCYTES # BLD AUTO: 0.45 THOUSAND/ΜL (ref 0.17–1.22)
MONOCYTES NFR BLD AUTO: 12 % (ref 4–12)
NEUTROPHILS # BLD AUTO: 1.81 THOUSANDS/ΜL (ref 1.85–7.62)
NEUTS SEG NFR BLD AUTO: 48 % (ref 43–75)
NONHDLC SERPL-MCNC: 165 MG/DL
NRBC BLD AUTO-RTO: 0 /100 WBCS
PLATELET # BLD AUTO: 317 THOUSANDS/UL (ref 149–390)
PMV BLD AUTO: 8.7 FL (ref 8.9–12.7)
POTASSIUM SERPL-SCNC: 4.3 MMOL/L (ref 3.5–5.3)
PROT SERPL-MCNC: 7.3 G/DL (ref 6.4–8.4)
RBC # BLD AUTO: 4.31 MILLION/UL (ref 3.81–5.12)
SODIUM SERPL-SCNC: 136 MMOL/L (ref 135–147)
TRIGL SERPL-MCNC: 101 MG/DL (ref ?–150)
TSH SERPL DL<=0.05 MIU/L-ACNC: 4.21 UIU/ML (ref 0.45–4.5)
WBC # BLD AUTO: 3.82 THOUSAND/UL (ref 4.31–10.16)

## 2025-01-07 PROCEDURE — 86376 MICROSOMAL ANTIBODY EACH: CPT

## 2025-01-07 PROCEDURE — 84443 ASSAY THYROID STIM HORMONE: CPT | Performed by: PHYSICIAN ASSISTANT

## 2025-01-07 PROCEDURE — 86800 THYROGLOBULIN ANTIBODY: CPT

## 2025-01-07 PROCEDURE — 36415 COLL VENOUS BLD VENIPUNCTURE: CPT

## 2025-01-07 PROCEDURE — 85025 COMPLETE CBC W/AUTO DIFF WBC: CPT | Performed by: PHYSICIAN ASSISTANT

## 2025-01-07 PROCEDURE — 80061 LIPID PANEL: CPT | Performed by: PHYSICIAN ASSISTANT

## 2025-01-07 PROCEDURE — 80053 COMPREHEN METABOLIC PANEL: CPT | Performed by: PHYSICIAN ASSISTANT

## 2025-01-08 LAB
THYROGLOB AB SERPL-ACNC: 60.4 IU/ML (ref 0–0.9)
THYROPEROXIDASE AB SERPL-ACNC: 229 IU/ML (ref 0–34)

## 2025-01-09 ENCOUNTER — TELEPHONE (OUTPATIENT)
Age: 50
End: 2025-01-09

## 2025-01-10 ENCOUNTER — RESULTS FOLLOW-UP (OUTPATIENT)
Dept: FAMILY MEDICINE CLINIC | Facility: CLINIC | Age: 50
End: 2025-01-10

## 2025-01-10 DIAGNOSIS — R53.82 CHRONIC FATIGUE: Primary | ICD-10-CM

## 2025-01-10 DIAGNOSIS — R76.8 THYROID ANTIBODY POSITIVE: ICD-10-CM

## 2025-01-10 DIAGNOSIS — E66.812 CLASS 2 OBESITY: ICD-10-CM

## 2025-01-10 NOTE — RESULT ENCOUNTER NOTE
These let the patient know the followin.  Her thyroid is functioning normally however she does have an autoimmune disorder in which her system is attacking her thyroid which is what is causing her thyroid antibodies to be elevated.  This can cause many different symptoms.  I would like her to consult with endocrinology order was placed.  2.  Bad LDL cholesterol is the highest it has been in many years at 145.  Patient needs to adapt to a healthier lifestyle with decreasing intake of fat and cholesterol.  I would like her LDL to be at least below 130.  Recheck 6 months to 1 year.

## 2025-01-22 NOTE — PROGRESS NOTES
Assessment & Plan   Problem List Items Addressed This Visit    None  Visit Diagnoses       Well woman exam    -  Primary      Perimenopause              Discussion    All questions have been answered to her satisfaction  RTO for APE or sooner if needed  Pap done.   Will watch cycles and call with any worsening complaints or desire for treatment or work up for the heavier bleeding      Subjective     HPI   Polly Schwab is a 49 y.o. female who presents for annual well woman exam.     LMP - 01/22/25 ; Periods are reg q 28ish days and last a few days; No excessive bleeding; No intermenstrual bleeding or spotting; Cramps are tolerable. Changes cup q 2 hours on heavier days.   She does note increased anger the day or two prior to her menses. She was recently d/w thyroid d/o and wants to see how she feels after that is more well managed.       No vulvar itch/burn; No vaginal itch/burn; No abn discharge or odor; No urinary sx - burning/pain/frequency/hematuria    No concerning breast masses, asymmetry, nipple discharge or bleeding, changes in skin of breast, or breast tenderness bilaterally    No abd/pelvic pain or HAs;     No perimenopausal VM symptoms.    Pt is sexually active in a mutually monog/ sexual relationship x 30 year; No issues with intercourse; She declines sti/hiv/hep testing; Feels safe at home  Current contraception: vasectomy     (+) PCP for routine Bw/care;    Last Pap : 05/08/2018WNL   History of abnormal Pap smear: denies   Mammo:11/21/24 BIRADS 2   Abnormal mammo: denies   Colonoscopy:09/01/22 WNL f/u 5 years    Review of Systems   Constitutional: Negative.    Respiratory: Negative.     Gastrointestinal: Negative.    Endocrine: Negative.    Genitourinary: Negative.        The following portions of the patient's history were reviewed and updated as appropriate: allergies, current medications, past family history, past medical history, past social history, past surgical history, and problem  list.         OB History        2    Para   2    Term   2            AB        Living   2       SAB        IAB        Ectopic        Multiple        Live Births   2                 Past Medical History:   Diagnosis Date   • Chicken pox 2015    Last assessed   • Depression    • Heartburn     occasional - over the last year   • History of vertigo 2017   • Ocular migraine     last on 2 weeks ago   • TMJ disease     mild       Past Surgical History:   Procedure Laterality Date   • PILONIDAL CYST EXCISION     • WISDOM TOOTH EXTRACTION         Family History   Problem Relation Age of Onset   • No Known Problems Mother    • Heart disease Father    • Diabetes Father    • Hyperlipidemia Father    • Parkinsonism Father    • Diabetes type II Father    • Heart attack Father         Fatal   • Cancer Maternal Grandfather         Brain cancer       Social History     Socioeconomic History   • Marital status: /Civil Union     Spouse name: Not on file   • Number of children: Not on file   • Years of education: Not on file   • Highest education level: Not on file   Occupational History   • Not on file   Tobacco Use   • Smoking status: Former   • Smokeless tobacco: Never   • Tobacco comments:     quit cigarettes in her 20's   Vaping Use   • Vaping status: Never Used   Substance and Sexual Activity   • Alcohol use: Yes     Alcohol/week: 1.0 standard drink of alcohol     Types: 1 Shots of liquor per week     Comment: Social   • Drug use: No   • Sexual activity: Yes     Partners: Male     Birth control/protection: Male Sterilization   Other Topics Concern   • Not on file   Social History Narrative   • Not on file     Social Drivers of Health     Financial Resource Strain: Not on file   Food Insecurity: Not on file   Transportation Needs: Not on file   Physical Activity: Not on file   Stress: Not on file   Social Connections: Not on file   Intimate Partner Violence: Not on file   Housing Stability: Not on file  "        Current Outpatient Medications:   •  Cetirizine HCl 10 MG CAPS, Take by mouth, Disp: , Rfl:   •  escitalopram (LEXAPRO) 10 mg tablet, Take 1 tablet (10 mg total) by mouth daily, Disp: 30 tablet, Rfl: 11  •  fluocinolone (SYNALAR) 0.025 % ointment, , Disp: , Rfl:   •  ketoconazole (NIZORAL) 2 % shampoo, , Disp: , Rfl:   •  Multiple Vitamin (DAILY VALUE MULTIVITAMIN PO), Take 1 tablet by mouth daily, Disp: , Rfl:   •  Probiotic Product (PROBIOTIC ADVANCED PO), Take by mouth, Disp: , Rfl:   •  hydrocortisone 2.5 % cream, Apply topically 2 (two) times a day as needed for irritation or rash (with itchy rashes) for up to 14 days, Disp: 30 g, Rfl: 1    Allergies   Allergen Reactions   • Gluten Meal - Food Allergy    • Wheat Bran - Food Allergy    • Bupropion Anxiety       Objective   Vitals:    01/27/25 0934   BP: 110/68   BP Location: Right arm   Patient Position: Sitting   Cuff Size: Large   Weight: 97.1 kg (214 lb)   Height: 5' 3\" (1.6 m)     Physical Exam  Vitals reviewed.   HENT:      Head: Normocephalic and atraumatic.   Cardiovascular:      Rate and Rhythm: Normal rate and regular rhythm.   Pulmonary:      Effort: Pulmonary effort is normal.      Breath sounds: Normal breath sounds.   Chest:   Breasts:     Breasts are symmetrical.      Right: No swelling, bleeding, inverted nipple, mass, nipple discharge, skin change or tenderness.      Left: No swelling, bleeding, inverted nipple, mass, nipple discharge, skin change or tenderness.   Abdominal:      General: Abdomen is flat. Bowel sounds are normal.      Palpations: Abdomen is soft.      Tenderness: There is no abdominal tenderness. There is no right CVA tenderness, left CVA tenderness or guarding.   Genitourinary:     General: Normal vulva.      Pubic Area: No rash.       Labia:         Right: No rash, tenderness, lesion or injury.         Left: No rash, tenderness, lesion or injury.       Urethra: No prolapse, urethral pain, urethral swelling or urethral " lesion.      Vagina: Normal. No signs of injury and foreign body. No vaginal discharge or erythema.      Cervix: Normal.      Uterus: Normal.       Adnexa: Right adnexa normal and left adnexa normal.   Musculoskeletal:      Cervical back: Neck supple.   Lymphadenopathy:      Upper Body:      Right upper body: No axillary adenopathy.      Left upper body: No axillary adenopathy.   Skin:     General: Skin is warm and dry.   Neurological:      Mental Status: She is alert and oriented to person, place, and time.   Psychiatric:         Mood and Affect: Mood normal.         Behavior: Behavior normal.         Thought Content: Thought content normal.         Judgment: Judgment normal.         There are no Patient Instructions on file for this visit.

## 2025-01-27 ENCOUNTER — OFFICE VISIT (OUTPATIENT)
Dept: OBGYN CLINIC | Facility: CLINIC | Age: 50
End: 2025-01-27
Payer: COMMERCIAL

## 2025-01-27 VITALS
DIASTOLIC BLOOD PRESSURE: 68 MMHG | BODY MASS INDEX: 37.92 KG/M2 | WEIGHT: 214 LBS | HEIGHT: 63 IN | SYSTOLIC BLOOD PRESSURE: 110 MMHG

## 2025-01-27 DIAGNOSIS — N95.1 PERIMENOPAUSE: ICD-10-CM

## 2025-01-27 DIAGNOSIS — Z01.419 WELL WOMAN EXAM: Primary | ICD-10-CM

## 2025-01-27 PROCEDURE — S0610 ANNUAL GYNECOLOGICAL EXAMINA: HCPCS | Performed by: PHYSICIAN ASSISTANT

## 2025-01-27 PROCEDURE — G0145 SCR C/V CYTO,THINLAYER,RESCR: HCPCS | Performed by: PHYSICIAN ASSISTANT

## 2025-01-27 PROCEDURE — G0476 HPV COMBO ASSAY CA SCREEN: HCPCS | Performed by: PHYSICIAN ASSISTANT

## 2025-01-27 NOTE — ADDENDUM NOTE
Addended by: JUANY HANCOCK on: 1/27/2025 11:09 AM     Modules accepted: Orders     [Normal Development] : Normal Development [None] : none

## 2025-01-28 LAB
HPV HR 12 DNA CVX QL NAA+PROBE: NEGATIVE
HPV16 DNA CVX QL NAA+PROBE: NEGATIVE
HPV18 DNA CVX QL NAA+PROBE: NEGATIVE

## 2025-01-29 ENCOUNTER — OFFICE VISIT (OUTPATIENT)
Dept: ENDOCRINOLOGY | Facility: CLINIC | Age: 50
End: 2025-01-29
Payer: COMMERCIAL

## 2025-01-29 VITALS
SYSTOLIC BLOOD PRESSURE: 112 MMHG | BODY MASS INDEX: 37.92 KG/M2 | DIASTOLIC BLOOD PRESSURE: 70 MMHG | HEIGHT: 63 IN | OXYGEN SATURATION: 98 % | HEART RATE: 80 BPM | WEIGHT: 214 LBS

## 2025-01-29 DIAGNOSIS — Z13.1 SCREENING FOR DIABETES MELLITUS: ICD-10-CM

## 2025-01-29 DIAGNOSIS — E55.9 VITAMIN D DEFICIENCY: ICD-10-CM

## 2025-01-29 DIAGNOSIS — R53.82 CHRONIC FATIGUE: ICD-10-CM

## 2025-01-29 DIAGNOSIS — E06.3 HASHIMOTO'S THYROIDITIS: Primary | ICD-10-CM

## 2025-01-29 DIAGNOSIS — R76.8 THYROID ANTIBODY POSITIVE: ICD-10-CM

## 2025-01-29 DIAGNOSIS — R79.89 ABNORMAL CBC: ICD-10-CM

## 2025-01-29 PROCEDURE — 99244 OFF/OP CNSLTJ NEW/EST MOD 40: CPT | Performed by: INTERNAL MEDICINE

## 2025-01-29 RX ORDER — ROFLUMILAST 3 MG/G
AEROSOL, FOAM TOPICAL
COMMUNITY
Start: 2025-01-26

## 2025-01-29 RX ORDER — EMOLLIENT COMBINATION NO.43
CREAM (GRAM) TOPICAL
COMMUNITY
Start: 2024-01-01

## 2025-01-29 RX ORDER — CETIRIZINE HYDROCHLORIDE 10 MG/1
CAPSULE, LIQUID FILLED ORAL
COMMUNITY

## 2025-01-29 NOTE — PROGRESS NOTES
Name: Polly Schwab      : 1975      MRN: [id number]  Encounter Provider: Kristina Al MD  Encounter Date: 2025   Encounter department: [facility]  :  Assessment & Plan  Hashimoto's thyroiditis  Patient with several month history of fatigue and weight gain  Elevated thyroid microsomal and anti-thyroglobulin antibodies  TSH - 4.2 WNL. However TSH within normal limits    Plan  Repeat TSH and free T4  US thyroid    Orders:    US thyroid; Future    Ambulatory Referral to Sleep Medicine; Future    TSH, 3rd generation; Future    T4, free; Future    Vitamin D 25 hydroxy; Future    CBC and differential; Future    Hemoglobin A1C With EAG; Future    Iron Panel (Includes Ferritin, Iron Sat%, Iron, and TIBC); Future    Thyroid antibody positive  Plan as above    Orders:    Ambulatory Referral to Endocrinology    US thyroid; Future    Ambulatory Referral to Sleep Medicine; Future    TSH, 3rd generation; Future    T4, free; Future    Vitamin D 25 hydroxy; Future    CBC and differential; Future    Hemoglobin A1C With EAG; Future    Iron Panel (Includes Ferritin, Iron Sat%, Iron, and TIBC); Future    Chronic fatigue  Will check vitamin D level and iron panel  Referral to Sleep Medicine    Orders:    Ambulatory Referral to Endocrinology    US thyroid; Future    Ambulatory Referral to Sleep Medicine; Future    TSH, 3rd generation; Future    T4, free; Future    Vitamin D 25 hydroxy; Future    CBC and differential; Future    Hemoglobin A1C With EAG; Future    Iron Panel (Includes Ferritin, Iron Sat%, Iron, and TIBC); Future    Vitamin D deficiency  Will check vitamin D in the setting of chronic fatigue    Orders:    US thyroid; Future    Ambulatory Referral to Sleep Medicine; Future    TSH, 3rd generation; Future    T4, free; Future    Vitamin D 25 hydroxy; Future    CBC and differential; Future    Hemoglobin A1C With EAG; Future    Iron Panel (Includes Ferritin, Iron Sat%,  "Iron, and TIBC); Future    Abnormal CBC  Leukopenia on recent CBC  Will repeat CBC    Orders:    US thyroid; Future    Ambulatory Referral to Sleep Medicine; Future    TSH, 3rd generation; Future    T4, free; Future    Vitamin D 25 hydroxy; Future    CBC and differential; Future    Hemoglobin A1C With EAG; Future    Iron Panel (Includes Ferritin, Iron Sat%, Iron, and TIBC); Future    Screening for diabetes mellitus  Will check HbA1c    Orders:    US thyroid; Future    Ambulatory Referral to Sleep Medicine; Future    TSH, 3rd generation; Future    T4, free; Future    Vitamin D 25 hydroxy; Future    CBC and differential; Future    Hemoglobin A1C With EAG; Future    Iron Panel (Includes Ferritin, Iron Sat%, Iron, and TIBC); Future        History of Present Illness   HPI  Polly Schwab is a 49 y.o. female who presents as a new consult for positive thyroid antibodies suggestive of Hashimoto's thyroiditis. Patient complains of increasing fatigue in the last 6 months or 10-15 pound weight gain since last year. Patient endorses fatigue and daytime somnolence despite getting adequate sleep during nights. Per patient, her  has commented on her snoring in the past 6 months. She has not really been eating \"healthy\" and has been too fatigued to go to the gym. She was experiencing some constipation around the holidays, but has increased fiber intake to help with this. Initially, she suspected her symptoms were perimenopausal causing her to delay medical evaluation. Her periods continue to be regular.         Review of Systems   Constitutional:  Positive for fatigue and unexpected weight change. Negative for chills and fever.   HENT:  Negative for ear pain and sore throat.    Eyes:  Negative for pain and visual disturbance.   Respiratory:  Negative for cough and shortness of breath.    Cardiovascular:  Negative for chest pain and palpitations.   Gastrointestinal:  Negative for abdominal pain and vomiting. " "  Genitourinary:  Negative for dysuria and hematuria.   Musculoskeletal:  Negative for arthralgias and back pain.   Skin:  Negative for color change and rash.   Neurological:  Negative for seizures and syncope.   Psychiatric/Behavioral:          Daytime somnolence   All other systems reviewed and are negative.         Objective   /70 (BP Location: Left arm, Patient Position: Sitting, Cuff Size: Large)   Pulse 80   Ht 5' 3.25\" (1.607 m)   Wt 97.1 kg (214 lb)   LMP 01/22/2025 (Exact Date)   SpO2 98%   BMI 37.61 kg/m²      Physical Exam  Constitutional:       Appearance: Normal appearance. She is obese.   HENT:      Head: Normocephalic and atraumatic.   Neck:      Thyroid: No thyroid mass, thyromegaly or thyroid tenderness.   Cardiovascular:      Rate and Rhythm: Normal rate and regular rhythm.      Pulses: Normal pulses.      Heart sounds: Normal heart sounds. No murmur heard.     No gallop.   Pulmonary:      Effort: Pulmonary effort is normal. No respiratory distress.      Breath sounds: Normal breath sounds. No wheezing or rales.   Skin:     General: Skin is warm and dry.   Neurological:      Mental Status: She is alert and oriented to person, place, and time.           "

## 2025-01-29 NOTE — ASSESSMENT & PLAN NOTE
Will check vitamin D level and iron panel  Referral to Sleep Medicine    Orders:    Ambulatory Referral to Endocrinology    US thyroid; Future    Ambulatory Referral to Sleep Medicine; Future    TSH, 3rd generation; Future    T4, free; Future    Vitamin D 25 hydroxy; Future    CBC and differential; Future    Hemoglobin A1C With EAG; Future    Iron Panel (Includes Ferritin, Iron Sat%, Iron, and TIBC); Future

## 2025-01-29 NOTE — PROGRESS NOTES
Referral reason:  Ordered on: 1/29/2025   Associated Dx: Chronic fatigue; Class 2 obesity; Thyroid antibody positive   Authorizing provider: Jolie Martinez PA-C

## 2025-01-29 NOTE — PATIENT INSTRUCTIONS
Plan have labs done as ordered    You are being referred an ultrasound of the thyroid  Also referred to sleep medicine

## 2025-01-30 ENCOUNTER — RESULTS FOLLOW-UP (OUTPATIENT)
Dept: OBGYN CLINIC | Facility: CLINIC | Age: 50
End: 2025-01-30

## 2025-01-30 PROBLEM — E06.3 HASHIMOTO'S THYROIDITIS: Status: ACTIVE | Noted: 2025-01-30

## 2025-01-30 PROBLEM — R79.89 ABNORMAL CBC: Status: ACTIVE | Noted: 2025-01-30

## 2025-01-30 PROBLEM — R76.8 THYROID ANTIBODY POSITIVE: Status: ACTIVE | Noted: 2025-01-30

## 2025-01-30 PROBLEM — E55.9 VITAMIN D DEFICIENCY: Status: ACTIVE | Noted: 2025-01-30

## 2025-01-30 PROBLEM — Z13.1 SCREENING FOR DIABETES MELLITUS: Status: ACTIVE | Noted: 2020-03-24

## 2025-01-30 LAB
LAB AP GYN PRIMARY INTERPRETATION: NORMAL
Lab: NORMAL

## 2025-01-30 NOTE — ASSESSMENT & PLAN NOTE
Will check vitamin D in the setting of chronic fatigue    Orders:     thyroid; Future    Ambulatory Referral to Sleep Medicine; Future    TSH, 3rd generation; Future    T4, free; Future    Vitamin D 25 hydroxy; Future    CBC and differential; Future    Hemoglobin A1C With EAG; Future    Iron Panel (Includes Ferritin, Iron Sat%, Iron, and TIBC); Future

## 2025-01-30 NOTE — ASSESSMENT & PLAN NOTE
Leukopenia on recent CBC  Will repeat CBC    Orders:    US thyroid; Future    Ambulatory Referral to Sleep Medicine; Future    TSH, 3rd generation; Future    T4, free; Future    Vitamin D 25 hydroxy; Future    CBC and differential; Future    Hemoglobin A1C With EAG; Future    Iron Panel (Includes Ferritin, Iron Sat%, Iron, and TIBC); Future

## 2025-01-30 NOTE — ASSESSMENT & PLAN NOTE
Will check HbA1c    Orders:    US thyroid; Future    Ambulatory Referral to Sleep Medicine; Future    TSH, 3rd generation; Future    T4, free; Future    Vitamin D 25 hydroxy; Future    CBC and differential; Future    Hemoglobin A1C With EAG; Future    Iron Panel (Includes Ferritin, Iron Sat%, Iron, and TIBC); Future

## 2025-01-30 NOTE — ASSESSMENT & PLAN NOTE
Plan as above    Orders:    Ambulatory Referral to Endocrinology    US thyroid; Future    Ambulatory Referral to Sleep Medicine; Future    TSH, 3rd generation; Future    T4, free; Future    Vitamin D 25 hydroxy; Future    CBC and differential; Future    Hemoglobin A1C With EAG; Future    Iron Panel (Includes Ferritin, Iron Sat%, Iron, and TIBC); Future

## 2025-01-30 NOTE — ASSESSMENT & PLAN NOTE
Patient with several month history of fatigue and weight gain  Elevated thyroid microsomal and anti-thyroglobulin antibodies  TSH - 4.2 WNL. However TSH within normal limits    Plan  Repeat TSH and free T4  US thyroid    Orders:    US thyroid; Future    Ambulatory Referral to Sleep Medicine; Future    TSH, 3rd generation; Future    T4, free; Future    Vitamin D 25 hydroxy; Future    CBC and differential; Future    Hemoglobin A1C With EAG; Future    Iron Panel (Includes Ferritin, Iron Sat%, Iron, and TIBC); Future

## 2025-02-01 ENCOUNTER — HOSPITAL ENCOUNTER (OUTPATIENT)
Dept: ULTRASOUND IMAGING | Facility: HOSPITAL | Age: 50
Discharge: HOME | End: 2025-02-01
Payer: COMMERCIAL

## 2025-02-01 DIAGNOSIS — E55.9 VITAMIN D DEFICIENCY: ICD-10-CM

## 2025-02-01 DIAGNOSIS — R53.82 CHRONIC FATIGUE: ICD-10-CM

## 2025-02-01 DIAGNOSIS — Z13.1 SCREENING FOR DIABETES MELLITUS: ICD-10-CM

## 2025-02-01 DIAGNOSIS — R79.89 ABNORMAL CBC: ICD-10-CM

## 2025-02-01 DIAGNOSIS — R76.8 THYROID ANTIBODY POSITIVE: ICD-10-CM

## 2025-02-01 DIAGNOSIS — E06.3 HASHIMOTO'S THYROIDITIS: ICD-10-CM

## 2025-02-01 PROCEDURE — 76536 US EXAM OF HEAD AND NECK: CPT

## 2025-02-07 ENCOUNTER — RESULTS FOLLOW-UP (OUTPATIENT)
Dept: ENDOCRINOLOGY | Facility: CLINIC | Age: 50
End: 2025-02-07

## 2025-02-18 DIAGNOSIS — F33.8 SEASONAL AFFECTIVE DISORDER (HCC): ICD-10-CM

## 2025-02-18 DIAGNOSIS — F33.0 MILD EPISODE OF RECURRENT MAJOR DEPRESSIVE DISORDER (HCC): ICD-10-CM

## 2025-02-19 RX ORDER — ESCITALOPRAM OXALATE 10 MG/1
10 TABLET ORAL DAILY
Qty: 30 TABLET | Refills: 2 | Status: SHIPPED | OUTPATIENT
Start: 2025-02-19

## 2025-02-20 ENCOUNTER — OFFICE VISIT (OUTPATIENT)
Dept: SLEEP CENTER | Facility: CLINIC | Age: 50
End: 2025-02-20
Payer: COMMERCIAL

## 2025-02-20 VITALS
WEIGHT: 214.4 LBS | OXYGEN SATURATION: 99 % | HEART RATE: 85 BPM | BODY MASS INDEX: 37.99 KG/M2 | HEIGHT: 63 IN | DIASTOLIC BLOOD PRESSURE: 70 MMHG | SYSTOLIC BLOOD PRESSURE: 128 MMHG

## 2025-02-20 DIAGNOSIS — E66.812 CLASS 2 OBESITY WITH BODY MASS INDEX (BMI) OF 37.0 TO 37.9 IN ADULT, UNSPECIFIED OBESITY TYPE, UNSPECIFIED WHETHER SERIOUS COMORBIDITY PRESENT: ICD-10-CM

## 2025-02-20 DIAGNOSIS — R06.83 SNORING: Primary | ICD-10-CM

## 2025-02-20 DIAGNOSIS — F33.0 MILD EPISODE OF RECURRENT MAJOR DEPRESSIVE DISORDER (HCC): ICD-10-CM

## 2025-02-20 DIAGNOSIS — R53.82 CHRONIC FATIGUE: ICD-10-CM

## 2025-02-20 PROCEDURE — 99204 OFFICE O/P NEW MOD 45 MIN: CPT | Performed by: STUDENT IN AN ORGANIZED HEALTH CARE EDUCATION/TRAINING PROGRAM

## 2025-02-20 NOTE — ASSESSMENT & PLAN NOTE
As above, with symptoms of chronic excessive daytime fatigue.  She was referred by her endocrinologist in the setting of recent diagnosis of Hashimoto's thyroiditis.  Orders:    Ambulatory Referral to Sleep Medicine    Home Study; Future

## 2025-02-20 NOTE — PROGRESS NOTES
Name: Polly Schwab      : 1975      MRN: [id number]  Encounter Provider: Jaiden Mendoza MD  Encounter Date: 2025   Encounter department: [facility]    :  Assessment & Plan  Snoring  Obstructive Sleep Apnea - Discussed pathophysiology of NANY, consequences of untreated NANY and treatment options including PAP therapy, mandibular advancement device, positional therapy, and surgical referral. - Discussed risks of sleepy driving and mitigation strategies (napping). Patient agrees to not drive if tired or sleepy. - Advised avoidance of alcohol and centrally acting medications as these can worsen NANY.  -Patient presents with symptoms of snoring, unrefreshing sleep quality, as well as airway crowding on exam.  Her collective signs and symptoms may be suggestive of undiagnosed sleep disordered breathing.  Home sleep study order has been placed today in the office.  - I asked the patient to return to the office once sleep testing is completed to review study results and treatment options.  Orders:    Home Study; Future    Chronic fatigue  As above, with symptoms of chronic excessive daytime fatigue.  She was referred by her endocrinologist in the setting of recent diagnosis of Hashimoto's thyroiditis.  Orders:    Ambulatory Referral to Sleep Medicine    Home Study; Future    Class 2 obesity with body mass index (BMI) of 37.0 to 37.9 in adult, unspecified obesity type, unspecified whether serious comorbidity present  Elevated BMI - We reviewed the association of elevated BMI on obstructive sleep apnea and sleep disordered breathing. Encouraged patient to follow healthy diet, regular exercise regimen, and pursue weight loss to manage symptoms.   .       Mild episode of recurrent major depressive disorder (HCC)  We reviewed the association between sleep, mood, and coexisting psychiatric disorders. We discussed the importance of obtaining adequate sleep of at least 7 hours nightly.  "Patient was encouraged to continue current prescribed medications and to continue follow up with their PCP for further management.            History of Present Illness     Pertinent positives/negatives included in HPI and also as noted:     Objective   /70   Pulse 85   Ht 5' 3.25\" (1.607 m)   Wt 97.3 kg (214 lb 6.4 oz)   LMP 01/22/2025 (Exact Date)   SpO2 99%   BMI 37.68 kg/m²     Neck Circumference: 15  [facility] New Patient Evaluation    Ms. Schwab is a 49 y.o. female with a PMH of depression, allergic rhinitis, obesity (BMI 37), who presents as a new patient for evaluation of sleep disordered breathing.     I have reviewed the endocrinology office note from 1/29/2025 with Yesica Ellis MD.    I have reviewed the 1/6/2025 family medicine office note with   Jolie Martinez PA-C.    History of Presenting Illness:    The patient snores. There are no choking and gasping episodes. There are no witnessed apneas. Typically 0 episode(s) of nocturia occur per night. The patient sleeps on her side and back, tossing and turning. She sleeps with one to two pillows. There are no reports of nocturnal behaviors.     The patient's [place] sleepiness scale score is 1/24 (<=10 is normal). She has not been sleepy while driving. She has not had a fall-asleep motor vehicle accident. There are no reports of hypnagogic hallucinations, cataplexy or sleep paralysis.    In terms of the patient's sleep/wake cycle symptoms:  Bedtime: 10:30 PM  SOL: Less than 45 minutes  Nocturnal awakenings: 3-4 X, Tossing and Turning  Wakeup time: 7:00am  Naps: 1-2 X weekly    Total sleep time estimate: Approximately 9 hours.     Weekends are approximately similar to week days.    Wears a grind guard at night for teeth grinding.     She has not tried any medications for poor sleep in the past.    Her legs do occasionally bother her on trying to initiate sleep.    Past Medical History:   Diagnosis Date    Chicken pox 03/04/2015    " Last assessed    Depression     Heartburn     occasional - over the last year    History of vertigo 2017    Ocular migraine     last on 2 weeks ago    TMJ disease     mild        Past Surgical History:   Procedure Laterality Date    PILONIDAL CYST EXCISION      WISDOM TOOTH EXTRACTION          No prior     Allergies   Allergen Reactions    Gluten Meal - Food Allergy     Wheat Bran - Food Allergy     Bupropion Anxiety        Current Outpatient Medications on File Prior to Visit   Medication Sig Dispense Refill    Antiseborrheic Products, Misc. (Promiseb) CREA       Cetirizine HCl (ZyrTEC Allergy) 10 MG CAPS       Cetirizine HCl 10 MG CAPS Take by mouth      escitalopram (LEXAPRO) 10 mg tablet TAKE ONE TABLET BY MOUTH EVERY DAY 30 tablet 2    fluocinolone (SYNALAR) 0.025 % ointment       ketoconazole (NIZORAL) 2 % shampoo       Multiple Vitamin (DAILY VALUE MULTIVITAMIN PO) Take 1 tablet by mouth daily      Probiotic Product (PROBIOTIC ADVANCED PO) Take by mouth      Roflumilast, Antiseborrheic, (Zoryve) 0.3 % FOAM        No current facility-administered medications on file prior to visit.         Family History: Her family history includes Anxiety disorder in her daughter; Cancer in her maternal grandfather; Depression in her brother; Diabetes in her father; Diabetes type II in her father; Heart attack in her father; Heart disease in her father and paternal grandfather; Hyperlipidemia in her father; Hypothyroidism in her mother; Migraines in her daughter and son; No Known Problems in her brother and maternal grandmother; Parkinsonism in her father and paternal grandmother; Patau's syndrome in her daughter; Supraventricular tachycardia in her daughter.    Father and brother with history of NANY.     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    Review of Systems: On review of systems, the patient reports: No AM Headaches, regular nasal sinus congestion,  does not wake with dry mouth and dry throat in morning.    Vitals:    02/20/25 0744   BP: 128/70   Pulse: 85   SpO2: 99%       Physical Examination:  Gen: No acute distress, not visibly anxious, speaking comfortably  H&N: MM III; no retro/micrognathia; no macroglossia; no visible thyromegaly  Neuro: Alert and oriented x3, interactive  Psych: Pleasant, normal affect  Skin: No visible rashes  Respiratory: No accessory muscle use, breathing comfortably  Cardiac: No visible edema of extremities  Abdomen: Soft, NT, distended  Musculoskeletal: Normal ROM Intact of Extremities    Study Results:  I reviewed the following labs:    No results found for: \"FERRITIN\"    Most recent labs reviewed: Hemoglobin 13.3, platelets 317 from 1/7/2025 all within normal limits.    Lab Results   Component Value Date    WBC 3.82 (L) 01/07/2025    HGB 13.3 01/07/2025    HCT 40.8 01/07/2025    MCV 95 01/07/2025     01/07/2025       This SmartLink has not been configured with any valid records.       Lab Results   Component Value Date    SODIUM 136 01/07/2025    K 4.3 01/07/2025     01/07/2025    CO2 27 01/07/2025    BUN 12 01/07/2025    CREATININE 0.61 01/07/2025    GLUC 93 06/27/2020    CALCIUM 9.2 01/07/2025       This SmartLink has not been configured with any valid records.       Lab Results   Component Value Date    PXR0BZBMXNVN 4.209 01/07/2025    TSH 1.93 05/15/2018          Results/Data:  I have reviewed the results and report from the 2/1/2025 thyroid ultrasound.    Independent Findings Reviewed: Mildly heterogeneous thyroid consistent with Hashimoto's thyroiditis.    I have reviewed the results and report from the 11/21/2024 left breast mammography.    Independent Findings Reviewed: BI-RADS 2.    I answered the " "patient's questions to the best of my ability. We will continue with longitudinal follow-up for evaluation of sleep disordered breathing. Follow-up will be after sleep testing is completed.    Jaiden Mendoza MD  Sleep Medicine and Internal Medicine  [facility]  02/20/25      Portions of the record may have been created with voice recognition software.  Occasional wrong word or \"sound a like\" substitutions may have occurred due to the inherent limitations of voice recognition software.  Read the chart carefully and recognize, using context, where substitutions have occurred.       "

## 2025-02-20 NOTE — ASSESSMENT & PLAN NOTE
We reviewed the association between sleep, mood, and coexisting psychiatric disorders. We discussed the importance of obtaining adequate sleep of at least 7 hours nightly. Patient was encouraged to continue current prescribed medications and to continue follow up with their PCP for further management.

## 2025-02-20 NOTE — PATIENT INSTRUCTIONS
"- A sleep study was ordered for you. It can be an in lab sleep study or a portable at home sleep study. It depends on what insurance approves.  Some insurances will only cover home sleep studies unless you have significant medical conditions like stroke or heart attack within the last 6 months, severe COPD, or the use of supplemental oxygen.    - The order goes electronically to the sleep center staff.    - The sleep center will get approval from your insurance and then will call you to schedule the sleep study.    - If you do not hear from the sleep center in 1 week, please call us to check the status of your order.    - There are different locations to get sleep study done.    - Please make sure you know what is the copay associated with your sleep study. Approval does not mean coverage.     - Once your sleep study is done, it can take up to 2 weeks to get results (depending on the volume).    - A follow up appointment to discuss sleep study results is required in most cases. On that visit, we will discuss results and treatment options.    - If your sleep study shows severe sleep apnea please expect contact from the sleep center. We will try to expedite your follow up appointment.    - The best way to communicate with us in through [facility]. Make sure your account is active.    - Once you start CPAP therapy, it is mandatory by insurance, to have a follow up appointment with us within 31-90 days of getting CPAP.     Patient Education     Sleep apnea in adults   The Basics   Written by the doctors and editors at [facility]   What is sleep apnea? -- Sleep apnea is a condition that makes you stop breathing for short periods while you are asleep. There are 2 types of sleep apnea. One is called \"obstructive sleep apnea.\" The other is called \"central sleep apnea.\"  In obstructive sleep apnea, you stop breathing because your throat narrows or closes (figure 1). In central sleep apnea, you stop breathing because your " "brain does not send the right signals to your muscles to make you breathe. When people talk about sleep apnea, they are usually referring to obstructive sleep apnea, which is what this article is about.  People with sleep apnea do not know that they stop breathing when they are asleep. But they do sometimes wake up startled or gasping for breath. They also often hear from loved ones that they snore.  What are the symptoms of sleep apnea? -- The main symptoms of sleep apnea are loud snoring, tiredness, and daytime sleepiness. Other symptoms can include:   Restless sleep   Waking up choking or gasping   Morning headaches, dry mouth, or sore throat   Waking up often to urinate   Waking up feeling unrested or groggy   Trouble thinking clearly or remembering things  Some people with sleep apnea don't have symptoms, or don't realize that they have them.  Should I see a doctor or nurse? -- Yes. If you think that you might have sleep apnea, see your doctor.  Is there a test for sleep apnea? -- Yes. First, your doctor or nurse will ask about your symptoms. If you have a bed partner, they might also ask that person if you snore or gasp in your sleep. If the doctor or nurse suspects that you have sleep apnea, they might send you for a \"sleep study.\"  Sleep studies can sometimes be done at home, but they are usually done in a sleep lab. For the study, you spend the night in the lab, and you are hooked up to different machines that monitor your heart rate, breathing, and other body functions. The results of the test tell your doctor or nurse if you have the disorder.  Is there anything I can do on my own to help my sleep apnea? -- Yes. Some things that might help:   Try to avoid sleeping on your back, if possible. This might help some people.   Lose weight, if you have excess body weight.   Get regular physical activity. This might help you lose weight. But even if it doesn't, being active is good for your health.   Avoid " "alcohol, especially in the evening. Alcohol can make sleep apnea worse.  How is sleep apnea treated? -- Treatment can include:   Weight loss - As mentioned above, weight loss can help if you have excess weight or obesity. But losing weight can be challenging, and it takes time to lose enough weight to help with your sleep apnea. Most people need other treatment while they work on losing weight.   CPAP - The most effective treatment for sleep apnea is a device that keeps your airway open while you sleep. Treatment with this device is called \"continuous positive airway pressure\" (\"CPAP\"). People getting CPAP wear a face mask at night that keeps them breathing (figure 2).  If your doctor or nurse recommends a CPAP machine, be patient about using it. The mask might seem uncomfortable to wear at first, and the machine might seem noisy, but using the machine can really help you. People with sleep apnea who use a CPAP machine feel more rested and generally feel better.  If CPAP does not work, your doctor might suggest other treatment. Options might include:   An oral device - This is a device that you wear in your mouth. It is called an \"oral appliance\" or \"mandibular advancement device.\" It helps keep your airway open while you sleep.   Hypoglossal nerve stimulation - This involves a procedure to implant a small device into your chest. The device has a wire that connects to the nerve under your tongue. While you are sleeping, it sends an electrical signal that causes the tongue to push forward. This helps open up your airway.   Surgery to widen your airway - This might involve removing your tonsils or other tissue that blocks the airway.  Is sleep apnea dangerous? -- It can be. Risks include:   Accidents - People with sleep apnea do not get good-quality sleep, so they are often tired and not alert. This puts them at risk for car accidents and other types of accidents.   Other health problems - Studies show that people " with sleep apnea are more likely than others to have high blood pressure, heart attacks, and other serious heart problems. Some people also have mood changes or depression.  In people with severe sleep apnea, getting treated (for example, with CPAP) can help lower these risks.  All topics are updated as new evidence becomes available and our peer review process is complete.  This topic retrieved from [employer] on: Feb 28, 2024.  Topic 76882 Version 18.0  Release: 32.2.4 - C32.58  © 2024 UpToDate, Inc. and/or its affiliates. All rights reserved.  figure 1: Airway in a person with sleep apnea     Normally, when a person sleeps, the airway remains open, and air can pass from the nose and mouth to the lungs. In a person with sleep apnea, parts of the throat and mouth drop into the airway and block off the flow of air. This can cause loud snoring and interrupt breathing for short periods.  Graphic 92339 Version 6.0  figure 2: Continuous positive airway pressure (CPAP) for sleep apnea     The CPAP mask gently blows air into your nose while you sleep. It puts just enough pressure on your airway to keep it from closing. The mask in this picture fits over just the nose. Other CPAP devices have masks that fit over the nose and mouth.  Graphic 94613 Version 5.0  Consumer Information Use and Disclaimer   Disclaimer: This generalized information is a limited summary of diagnosis, treatment, and/or medication information. It is not meant to be comprehensive and should be used as a tool to help the user understand and/or assess potential diagnostic and treatment options. It does NOT include all information about conditions, treatments, medications, side effects, or risks that may apply to a specific patient. It is not intended to be medical advice or a substitute for the medical advice, diagnosis, or treatment of a health care provider based on the health care provider's examination and assessment of a patient's specific and unique  circumstances. Patients must speak with a health care provider for complete information about their health, medical questions, and treatment options, including any risks or benefits regarding use of medications. This information does not endorse any treatments or medications as safe, effective, or approved for treating a specific patient. UpToDate, Inc. and its affiliates disclaim any warranty or liability relating to this information or the use thereof.The use of this information is governed by the Terms of Use, available at https://www.woltersZANK.mobiuwer.com/en/know/clinical-effectiveness-terms. 2024© UpToDate, Inc. and its affiliates and/or licensors. All rights reserved.  Copyright   © 2024 UpToDate, Inc. and/or its affiliates. All rights reserved.

## 2025-03-01 PROBLEM — Z13.1 SCREENING FOR DIABETES MELLITUS: Status: RESOLVED | Noted: 2020-03-24 | Resolved: 2025-03-01

## 2025-04-17 ENCOUNTER — HOSPITAL ENCOUNTER (OUTPATIENT)
Dept: SLEEP CENTER | Facility: CLINIC | Age: 50
Discharge: HOME | End: 2025-04-17
Payer: COMMERCIAL

## 2025-04-17 DIAGNOSIS — R06.83 SNORING: ICD-10-CM

## 2025-04-17 DIAGNOSIS — R53.82 CHRONIC FATIGUE: ICD-10-CM

## 2025-04-17 PROCEDURE — G0399 HOME SLEEP TEST/TYPE 3 PORTA: HCPCS

## 2025-04-18 NOTE — PROGRESS NOTES
Home Sleep Study Documentation    HOME STUDY DEVICE: Noxturnal no                                           Clarita G3 yes      Pre-Sleep Home Study:    Set-up and instructions performed by: KELLY Werner    Technician performed demonstration for Patient: yes    Return demonstration performed by Patient: yes    Written instructions provided to Patient: yes    Patient signed consent form: yes        Post-Sleep Home Study:    Additional comments by Patient: pending    Home Sleep Study Failed:pending    Failure reason: pending    Reported or Detected: pending    Scored by: pending

## 2025-04-23 PROBLEM — G47.33 OSA (OBSTRUCTIVE SLEEP APNEA): Status: ACTIVE | Noted: 2025-04-23

## 2025-04-24 ENCOUNTER — DOCUMENTATION (OUTPATIENT)
Dept: SLEEP CENTER | Facility: CLINIC | Age: 50
End: 2025-04-24

## 2025-04-24 PROBLEM — R06.83 SNORING: Status: ACTIVE | Noted: 2025-04-24

## 2025-04-24 PROCEDURE — G0399 HOME SLEEP TEST/TYPE 3 PORTA: HCPCS | Performed by: STUDENT IN AN ORGANIZED HEALTH CARE EDUCATION/TRAINING PROGRAM

## 2025-04-24 NOTE — PROGRESS NOTES
Patient recently completed a home sleep study which revealed moderate obstructive sleep apnea.    I have messaged the clinical sleep pool to contact the patient regarding sleep study results and schedule office follow-up to discuss further.

## 2025-05-10 ENCOUNTER — TELEPHONE (OUTPATIENT)
Dept: SLEEP CENTER | Facility: CLINIC | Age: 50
End: 2025-05-10

## 2025-05-10 NOTE — TELEPHONE ENCOUNTER
Home sleep study resulted and shows moderate sleep apnea with event related desaturations.   ESTEPHANIA 15.4.  Follow up with Dr. Mendoza recommended.     Results read by patient.     Called patient and left message advising I will send a [employer] message with the sleep study results and next steps.  Provided sleep center number([phone]) to call if any questions.

## 2025-05-16 DIAGNOSIS — E06.3 HASHIMOTO'S THYROIDITIS: Primary | ICD-10-CM

## 2025-05-22 ENCOUNTER — OFFICE VISIT (OUTPATIENT)
Dept: SLEEP CENTER | Facility: CLINIC | Age: 50
End: 2025-05-22
Payer: COMMERCIAL

## 2025-05-22 VITALS
BODY MASS INDEX: 36.93 KG/M2 | WEIGHT: 208.4 LBS | DIASTOLIC BLOOD PRESSURE: 72 MMHG | SYSTOLIC BLOOD PRESSURE: 122 MMHG | HEIGHT: 63 IN

## 2025-05-22 DIAGNOSIS — G47.33 OSA (OBSTRUCTIVE SLEEP APNEA): Primary | ICD-10-CM

## 2025-05-22 DIAGNOSIS — F33.0 MILD EPISODE OF RECURRENT MAJOR DEPRESSIVE DISORDER (HCC): ICD-10-CM

## 2025-05-22 DIAGNOSIS — E66.812 CLASS 2 OBESITY WITH BODY MASS INDEX (BMI) OF 37.0 TO 37.9 IN ADULT, UNSPECIFIED OBESITY TYPE, UNSPECIFIED WHETHER SERIOUS COMORBIDITY PRESENT: ICD-10-CM

## 2025-05-22 PROCEDURE — 99214 OFFICE O/P EST MOD 30 MIN: CPT | Performed by: STUDENT IN AN ORGANIZED HEALTH CARE EDUCATION/TRAINING PROGRAM

## 2025-05-22 NOTE — PATIENT INSTRUCTIONS
"Oral Appliance/Mandibular Advancement Device for Obstructive Sleep Apnea  -Go to [employer].com and search for \"mandibular advancement device\" or \"oral appliance for sleep apnea.\"  - Most options are between $.   -These devices pull the lower jaw forward to get the tongue out of the way of the upper airway and to treat obstructive sleep apnea  -Use with all periods of sleep to treat sleep disordered breathing  -We may repeat a sleep study with the oral appliance in place to assure proper treatment of obstructive sleep apnea      Patient Education     Sleep apnea in adults   The Basics   Written by the doctors and editors at [facility]   What is sleep apnea? -- Sleep apnea is a condition that makes you stop breathing for short periods while you are asleep. There are 2 types of sleep apnea. One is called \"obstructive sleep apnea.\" The other is called \"central sleep apnea.\"  In obstructive sleep apnea, you stop breathing because your throat narrows or closes (figure 1). In central sleep apnea, you stop breathing because your brain does not send the right signals to your muscles to make you breathe. When people talk about sleep apnea, they are usually referring to obstructive sleep apnea, which is what this article is about.  People with sleep apnea do not know that they stop breathing when they are asleep. But they do sometimes wake up startled or gasping for breath. They also often hear from loved ones that they snore.  What are the symptoms of sleep apnea? -- The main symptoms of sleep apnea are loud snoring, tiredness, and daytime sleepiness. Other symptoms can include:   Restless sleep   Waking up choking or gasping   Morning headaches, dry mouth, or sore throat   Waking up often to urinate   Waking up feeling unrested or groggy   Trouble thinking clearly or remembering things  Some people with sleep apnea don't have symptoms, or don't realize that they have them.  Should I see a doctor or nurse? -- Yes. If you " "think that you might have sleep apnea, see your doctor.  Is there a test for sleep apnea? -- Yes. First, your doctor or nurse will ask about your symptoms. If you have a bed partner, they might also ask that person if you snore or gasp in your sleep. If the doctor or nurse suspects that you have sleep apnea, they might send you for a \"sleep study.\"  Sleep studies can sometimes be done at home, but they are usually done in a sleep lab. For the study, you spend the night in the lab, and you are hooked up to different machines that monitor your heart rate, breathing, and other body functions. The results of the test tell your doctor or nurse if you have the disorder.  Is there anything I can do on my own to help my sleep apnea? -- Yes. Some things that might help:   Try to avoid sleeping on your back, if possible. This might help some people.   Lose weight, if you have excess body weight.   Get regular physical activity. This might help you lose weight. But even if it doesn't, being active is good for your health.   Avoid alcohol, especially in the evening. Alcohol can make sleep apnea worse.  How is sleep apnea treated? -- Treatment can include:   Weight loss - As mentioned above, weight loss can help if you have excess weight or obesity. But losing weight can be challenging, and it takes time to lose enough weight to help with your sleep apnea. Most people need other treatment while they work on losing weight.   CPAP - The most effective treatment for sleep apnea is a device that keeps your airway open while you sleep. Treatment with this device is called \"continuous positive airway pressure\" (\"CPAP\"). People getting CPAP wear a face mask at night that keeps them breathing (figure 2).  If your doctor or nurse recommends a CPAP machine, be patient about using it. The mask might seem uncomfortable to wear at first, and the machine might seem noisy, but using the machine can really help you. People with sleep apnea who " "use a CPAP machine feel more rested and generally feel better.  If CPAP does not work, your doctor might suggest other treatment. Options might include:   An oral device - This is a device that you wear in your mouth. It is called an \"oral appliance\" or \"mandibular advancement device.\" It helps keep your airway open while you sleep.   Hypoglossal nerve stimulation - This involves a procedure to implant a small device into your chest. The device has a wire that connects to the nerve under your tongue. While you are sleeping, it sends an electrical signal that causes the tongue to push forward. This helps open up your airway.   Surgery to widen your airway - This might involve removing your tonsils or other tissue that blocks the airway.  Is sleep apnea dangerous? -- It can be. Risks include:   Accidents - People with sleep apnea do not get good-quality sleep, so they are often tired and not alert. This puts them at risk for car accidents and other types of accidents.   Other health problems - Studies show that people with sleep apnea are more likely than others to have high blood pressure, heart attacks, and other serious heart problems. Some people also have mood changes or depression.  In people with severe sleep apnea, getting treated (for example, with CPAP) can help lower these risks.  All topics are updated as new evidence becomes available and our peer review process is complete.  This topic retrieved from [employer] on: Feb 28, 2024.  Topic 97223 Version 18.0  Release: 32.2.4 - C32.58  © 2024 UpToDate, Inc. and/or its affiliates. All rights reserved.  figure 1: Airway in a person with sleep apnea     Normally, when a person sleeps, the airway remains open, and air can pass from the nose and mouth to the lungs. In a person with sleep apnea, parts of the throat and mouth drop into the airway and block off the flow of air. This can cause loud snoring and interrupt breathing for short periods.  Graphic 50607 " Version 6.0  figure 2: Continuous positive airway pressure (CPAP) for sleep apnea     The CPAP mask gently blows air into your nose while you sleep. It puts just enough pressure on your airway to keep it from closing. The mask in this picture fits over just the nose. Other CPAP devices have masks that fit over the nose and mouth.  Graphic 44784 Version 5.0  Consumer Information Use and Disclaimer   Disclaimer: This generalized information is a limited summary of diagnosis, treatment, and/or medication information. It is not meant to be comprehensive and should be used as a tool to help the user understand and/or assess potential diagnostic and treatment options. It does NOT include all information about conditions, treatments, medications, side effects, or risks that may apply to a specific patient. It is not intended to be medical advice or a substitute for the medical advice, diagnosis, or treatment of a health care provider based on the health care provider's examination and assessment of a patient's specific and unique circumstances. Patients must speak with a health care provider for complete information about their health, medical questions, and treatment options, including any risks or benefits regarding use of medications. This information does not endorse any treatments or medications as safe, effective, or approved for treating a specific patient. UpToDate, Inc. and its affiliates disclaim any warranty or liability relating to this information or the use thereof.The use of this information is governed by the Terms of Use, available at https://www.Outcomes IncorporatedtersWeMontageer.com/en/know/clinical-effectiveness-terms. 2024© UpToDate, Inc. and its affiliates and/or licensors. All rights reserved.  Copyright   © 2024 UpToDate, Inc. and/or its affiliates. All rights reserved.

## 2025-05-22 NOTE — ASSESSMENT & PLAN NOTE
Moderate Obstructive Sleep Apnea - Discussed pathophysiology of NANY, consequences of untreated NANY and treatment options including PAP therapy, mandibular advancement device, positional therapy, and surgical referral. - Discussed risks of sleepy driving and mitigation strategies (napping). Patient agrees to not drive if tired or sleepy. - Advised avoidance of alcohol and centrally acting medications as these can worsen NANY.  - Polly presents today for follow-up of recent home sleep testing which revealed moderate obstructive sleep apnea.  I have reviewed her home sleep study together in detail today with the patient in the office.  We reviewed treatment modalities for mild obstructive sleep apnea.  Patient elects to trial an oral appliance/mandibular advancement device.  - We have reviewed and discussed these devices in detail today in the office.  I have provided information in her after visit summary.  I have asked her to try 1 of these devices with all periods of sleep.  I have asked her to return to the office within 3 to 4 months for close clinical follow-up for further evaluation.  Patient verbalized understanding and stated that she would do so.